# Patient Record
Sex: FEMALE | Race: ASIAN | NOT HISPANIC OR LATINO | Employment: OTHER | URBAN - METROPOLITAN AREA
[De-identification: names, ages, dates, MRNs, and addresses within clinical notes are randomized per-mention and may not be internally consistent; named-entity substitution may affect disease eponyms.]

---

## 2017-05-22 NOTE — PROGRESS NOTES
SUBJECTIVE:     CC: Maria Del Carmen Almaraz is an 61 year old woman who presents for preventive health visit.     General -- Patient states she is feeling generally well. She presents with records of labs from the last 3 years. She notices she is not as agile as she used to be, which she relates to age. She states she has a gynecologist where she gets mammograms and pap tests. Patient received her Hep C screening from her gynecologist. She also sees a dermatologist for skin care. We discussed creating a living will and she plans to make one at a later date. Patient states she has plantar fascitis, which has flared up recently.    Arthritis -- Patient states she has noticed mild achiness in her knees in her hips.    Physical   Annual:     Getting at least 3 servings of Calcium per day::  Yes    Bi-annual eye exam::  Yes    Dental care twice a year::  Yes    Sleep apnea or symptoms of sleep apnea::  None    Diet::  Regular (no restrictions)    Frequency of exercise::  4-5 days/week    Duration of exercise::  45-60 minutes    Taking medications regularly::  Not Applicable    Additional concerns today::  No    Today's PHQ-2 Score:   PHQ-2 ( 1999 [employer]) 5/20/2017   Q1: Little interest or pleasure in doing things -   Q2: Feeling down, depressed or hopeless -   PHQ-2 Score -   Little interest or pleasure in doing things Not at all   Feeling down, depressed or hopeless Not at all   PHQ-2 Score 0     Abuse: Current or Past(Physical, Sexual or Emotional)- No  Do you feel safe in your environment - Yes    Social History   Substance Use Topics     Smoking status: Never Smoker     Smokeless tobacco: Never Used     Alcohol use Yes      Comment: rare     The patient does not drink >3 drinks per day nor >7 drinks per week.    No results for input(s): CHOL, HDL, LDL, TRIG, CHOLHDLRATIO, NHDL in the last 99324 hours.    Reviewed orders with patient.  Reviewed health maintenance and updated orders accordingly - Yes    Mammo Decision  "Support:  Mammo discussed, not appropriate for or declined by this patient.    Pertinent mammograms are reviewed under the imaging tab.  History of abnormal Pap smear: NO - age 30-65 PAP every 5 years with negative HPV co-testing recommended    Reviewed and updated as needed this visit by clinical staff  Tobacco  Allergies  Meds  Med Hx  Surg Hx  Fam Hx  Soc Hx        Reviewed and updated as needed this visit by Provider        History reviewed. No pertinent past medical history.     ROS:  Constitutional, HEENT, cardiovascular, pulmonary, GI, , musculoskeletal, neuro, skin, endocrine and psych systems are negative, except as in HPI or otherwise noted     This document serves as a record of the services and decisions personally performed and made by Milton Craft MD. It was created on their behalf by Mert Nascimento, a trained medical scribe. The creation of this document is based the provider's statements to the medical scribe.  Mert Nascimento May 23, 2017 9:03 AM    Problem list, Medication list, Allergies, and Medical/Social/Surgical histories reviewed in [facility] and updated as appropriate.  Labs reviewed in EPIC  OBJECTIVE:     /76 (BP Location: Right arm, Patient Position: Chair, Cuff Size: Adult Regular)  Pulse 74  Temp 97.2  F (36.2  C) (Oral)  Ht 1.626 m (5' 4\")  Wt 56.2 kg (124 lb)  SpO2 100%  BMI 21.28 kg/m2  EXAM:  GENERAL APPEARANCE: healthy, alert and no distress  EYES: Eyes grossly normal to inspection, PERRL and conjunctivae and sclerae normal  HENT: ear canals and TM's normal, nose and mouth without ulcers or lesions, oropharynx clear and oral mucous membranes moist  NECK: no adenopathy, no asymmetry, masses, or scars and thyroid normal to palpation  RESP: lungs clear to auscultation - no rales, rhonchi or wheezes  CV: regular rate and rhythm, normal S1 S2, no S3 or S4, no murmur, click or rub, no peripheral edema and peripheral pulses strong  ABDOMEN: soft, nontender, no " "hepatosplenomegaly, no masses and bowel sounds normal, normal liver edge  MS: no musculoskeletal defects are noted and gait is age appropriate without ataxia  SKIN: no suspicious lesions or rashes to visible skin  NEURO: mentation intact and speech normal  PSYCH: mentation appears normal and affect normal/bright    ASSESSMENT/PLAN:     (Z00.00) Routine history and physical examination of adult  (primary encounter diagnosis)  Comment: generally in excellent health , no real concerns at all  Plan: CBC with platelets differential, Comprehensive         metabolic panel, Vitamin B12            (Z13.6) CARDIOVASCULAR SCREENING; LDL GOAL LESS THAN 160  Comment: calculate framingham risk index score   Plan: Lipid panel reflex to direct LDL        And follow up as indicated     (D75.89) Macrocytosis without anemia  Comment: noted from previous laboratory workup with [facility]   Plan: CBC with platelets differential, Vitamin B12              COUNSELING:  Reviewed preventive health counseling, as reflected in patient instructions       Regular exercise       Healthy diet/nutrition       Vision screening       Hearing screening       Alcohol Use       Consider Hep C screening for patients born between 1945 and 1965     reports that she has never smoked. She has never used smokeless tobacco.    Estimated body mass index is 21.28 kg/(m^2) as calculated from the following:    Height as of this encounter: 1.626 m (5' 4\").    Weight as of this encounter: 56.2 kg (124 lb).     Counseling Resources:  ATP IV Guidelines  Pooled Cohorts Equation Calculator  Breast Cancer Risk Calculator  FRAX Risk Assessment  ICSI Preventive Guidelines  Dietary Guidelines for Americans, 2010  USDA's MyPlate  ASA Prophylaxis  Lung CA Screening    The information in this document, created by the medical scribe for me, accurately reflects the services I personally performed and the decisions made by me. I have reviewed and approved this document for " accuracy.   MD Milton Bhakta MD  [facility]

## 2017-05-22 NOTE — PATIENT INSTRUCTIONS
- Follow up with me in 1 year.    Preventive Health Recommendations  Female Ages 50 - 64    Yearly exam: See your health care provider every year in order to  o Review health changes.   o Discuss preventive care.    o Review your medicines if your doctor has prescribed any.      Get a Pap test every three years (unless you have an abnormal result and your provider advises testing more often).    If you get Pap tests with HPV test, you only need to test every 5 years, unless you have an abnormal result.     You do not need a Pap test if your uterus was removed (hysterectomy) and you have not had cancer.    You should be tested each year for STDs (sexually transmitted diseases) if you're at risk.     Have a mammogram every 1 to 2 years.    Have a colonoscopy at age 50, or have a yearly FIT test (stool test). These exams screen for colon cancer.      Have a cholesterol test every 5 years, or more often if advised.    Have a diabetes test (fasting glucose) every three years. If you are at risk for diabetes, you should have this test more often.     If you are at risk for osteoporosis (brittle bone disease), think about having a bone density scan (DEXA).    Shots: Get a flu shot each year. Get a tetanus shot every 10 years.    Nutrition:     Eat at least 5 servings of fruits and vegetables each day.    Eat whole-grain bread, whole-wheat pasta and brown rice instead of white grains and rice.    Talk to your provider about Calcium and Vitamin D.     Lifestyle    Exercise at least 150 minutes a week (30 minutes a day, 5 days a week). This will help you control your weight and prevent disease.    Limit alcohol to one drink per day.    No smoking.     Wear sunscreen to prevent skin cancer.     See your dentist every six months for an exam and cleaning.    See your eye doctor every 1 to 2 years.  [facility]    If you have any questions regarding to your visit please contact your care team:     Team [place]:   Clinic  Hours Telephone Number   Internal Medicine:  Dr. Janeth Bond, NP       7am-7pm  Monday - Thursday   7am-5pm  Fridays  [phone]  (Appointment scheduling available 24/7)    Questions about your visit?  Team Line  [phone]   Urgent Care - [place] - 11am-9pm Monday-Friday Saturday-Sunday- 9am-5pm   [place] - 5pm-9pm Monday-Friday Saturday-Sunday- 9am-5pm  [phone] - [place]   [phone] - [place]       What options do I have for visits at the clinic other than the traditional office visit?  To expand how we care for you, many of our providers are utilizing electronic visits (e-visits) and telephone visits, when medically appropriate, for interactions with their patients rather than a visit in the clinic.   We also offer nurse visits for many medical concerns. Just like any other service, we will bill your insurance company for this type of visit based on time spent on the phone with your provider. Not all insurance companies cover these visits. Please check with your medical insurance if this type of visit is covered. You will be responsible for any charges that are not paid by your insurance.      E-visits via [employer]:  generally incur a $35.00 fee.  Telephone visits:  Time spent on the phone: *charged based on time that is spent on the phone in increments of 10 minutes. Estimated cost:   5-10 mins $30.00   11-20 mins. $59.00   21-30 mins. $85.00   Use [employer] (secure email communication and access to your chart) to send your primary care provider a message or make an appointment. Ask someone on your Team how to sign up for [employer].    For a Price Quote for your services, please call our Consumer Price Line at [phone].    As always, Thank you for trusting us with your health care needs!    Laura Flores MA

## 2017-05-23 ENCOUNTER — OFFICE VISIT (OUTPATIENT)
Dept: FAMILY MEDICINE | Facility: CLINIC | Age: 62
End: 2017-05-23
Payer: COMMERCIAL

## 2017-05-23 VITALS
SYSTOLIC BLOOD PRESSURE: 102 MMHG | BODY MASS INDEX: 21.17 KG/M2 | WEIGHT: 124 LBS | HEART RATE: 74 BPM | TEMPERATURE: 97.2 F | OXYGEN SATURATION: 100 % | DIASTOLIC BLOOD PRESSURE: 76 MMHG | HEIGHT: 64 IN

## 2017-05-23 DIAGNOSIS — Z00.00 ROUTINE HISTORY AND PHYSICAL EXAMINATION OF ADULT: Primary | ICD-10-CM

## 2017-05-23 DIAGNOSIS — Z13.6 CARDIOVASCULAR SCREENING; LDL GOAL LESS THAN 160: ICD-10-CM

## 2017-05-23 DIAGNOSIS — D75.89 MACROCYTOSIS WITHOUT ANEMIA: ICD-10-CM

## 2017-05-23 LAB
ALBUMIN SERPL-MCNC: 4.1 G/DL (ref 3.4–5)
ALP SERPL-CCNC: 94 U/L (ref 40–150)
ALT SERPL W P-5'-P-CCNC: 25 U/L (ref 0–50)
ANION GAP SERPL CALCULATED.3IONS-SCNC: 9 MMOL/L (ref 3–14)
AST SERPL W P-5'-P-CCNC: 23 U/L (ref 0–45)
BASOPHILS # BLD AUTO: 0.1 10E9/L (ref 0–0.2)
BASOPHILS NFR BLD AUTO: 1.2 %
BILIRUB SERPL-MCNC: 0.6 MG/DL (ref 0.2–1.3)
BUN SERPL-MCNC: 14 MG/DL (ref 7–30)
CALCIUM SERPL-MCNC: 9.3 MG/DL (ref 8.5–10.1)
CHLORIDE SERPL-SCNC: 102 MMOL/L (ref 94–109)
CHOLEST SERPL-MCNC: 246 MG/DL
CO2 SERPL-SCNC: 29 MMOL/L (ref 20–32)
CREAT SERPL-MCNC: 0.86 MG/DL (ref 0.52–1.04)
DIFFERENTIAL METHOD BLD: NORMAL
EOSINOPHIL # BLD AUTO: 0.1 10E9/L (ref 0–0.7)
EOSINOPHIL NFR BLD AUTO: 2.5 %
ERYTHROCYTE [DISTWIDTH] IN BLOOD BY AUTOMATED COUNT: 12.7 % (ref 10–15)
GFR SERPL CREATININE-BSD FRML MDRD: 66 ML/MIN/1.7M2
GLUCOSE SERPL-MCNC: 89 MG/DL (ref 70–99)
HCT VFR BLD AUTO: 43.2 % (ref 35–47)
HDLC SERPL-MCNC: 74 MG/DL
HGB BLD-MCNC: 14.4 G/DL (ref 11.7–15.7)
LDLC SERPL CALC-MCNC: 150 MG/DL
LYMPHOCYTES # BLD AUTO: 1.7 10E9/L (ref 0.8–5.3)
LYMPHOCYTES NFR BLD AUTO: 38 %
MCH RBC QN AUTO: 31.9 PG (ref 26.5–33)
MCHC RBC AUTO-ENTMCNC: 33.3 G/DL (ref 31.5–36.5)
MCV RBC AUTO: 96 FL (ref 78–100)
MONOCYTES # BLD AUTO: 0.5 10E9/L (ref 0–1.3)
MONOCYTES NFR BLD AUTO: 11.3 %
NEUTROPHILS # BLD AUTO: 2 10E9/L (ref 1.6–8.3)
NEUTROPHILS NFR BLD AUTO: 47 %
NONHDLC SERPL-MCNC: 172 MG/DL
PLATELET # BLD AUTO: 233 10E9/L (ref 150–450)
POTASSIUM SERPL-SCNC: 3.8 MMOL/L (ref 3.4–5.3)
PROT SERPL-MCNC: 7.5 G/DL (ref 6.8–8.8)
RBC # BLD AUTO: 4.52 10E12/L (ref 3.8–5.2)
SODIUM SERPL-SCNC: 140 MMOL/L (ref 133–144)
TRIGL SERPL-MCNC: 111 MG/DL
VIT B12 SERPL-MCNC: 650 PG/ML (ref 193–986)
WBC # BLD AUTO: 4.3 10E9/L (ref 4–11)

## 2017-05-23 PROCEDURE — 36415 COLL VENOUS BLD VENIPUNCTURE: CPT | Performed by: INTERNAL MEDICINE

## 2017-05-23 PROCEDURE — 80053 COMPREHEN METABOLIC PANEL: CPT | Performed by: INTERNAL MEDICINE

## 2017-05-23 PROCEDURE — 85025 COMPLETE CBC W/AUTO DIFF WBC: CPT | Performed by: INTERNAL MEDICINE

## 2017-05-23 PROCEDURE — 99396 PREV VISIT EST AGE 40-64: CPT | Performed by: INTERNAL MEDICINE

## 2017-05-23 PROCEDURE — 82607 VITAMIN B-12: CPT | Performed by: INTERNAL MEDICINE

## 2017-05-23 PROCEDURE — 80061 LIPID PANEL: CPT | Performed by: INTERNAL MEDICINE

## 2017-05-23 RX ORDER — CHLORAL HYDRATE 500 MG
CAPSULE ORAL
COMMUNITY
Start: 2013-08-09 | End: 2022-09-22

## 2017-05-23 RX ORDER — DIPHENOXYLATE HYDROCHLORIDE AND ATROPINE SULFATE 2.5; .025 MG/1; MG/1
1 TABLET ORAL
COMMUNITY
Start: 2013-08-09

## 2017-05-23 NOTE — NURSING NOTE
"Chief Complaint   Patient presents with     Physical       Initial /76 (BP Location: Right arm, Patient Position: Chair, Cuff Size: Adult Regular)  Pulse 74  Temp 97.2  F (36.2  C) (Oral)  Ht 5' 4\" (1.626 m)  Wt 124 lb (56.2 kg)  SpO2 100%  BMI 21.28 kg/m2 Estimated body mass index is 21.28 kg/(m^2) as calculated from the following:    Height as of this encounter: 5' 4\" (1.626 m).    Weight as of this encounter: 124 lb (56.2 kg).  Medication Reconciliation: complete   Audrey CROSS CMA ([facility])      "

## 2017-05-23 NOTE — MR AVS SNAPSHOT
After Visit Summary   5/23/2017    Maria Del Carmen Almaraz    MRN: [id number]           Patient Information     Date Of Birth          1955        Visit Information        Provider Department      5/23/2017 8:50 AM Milton Craft MD [facility]        Today's Diagnoses     Routine history and physical examination of adult    -  1    CARDIOVASCULAR SCREENING; LDL GOAL LESS THAN 160        Macrocytosis without anemia          Care Instructions    - Follow up with me in 1 year.    Preventive Health Recommendations  Female Ages 50 - 64    Yearly exam: See your health care provider every year in order to  o Review health changes.   o Discuss preventive care.    o Review your medicines if your doctor has prescribed any.      Get a Pap test every three years (unless you have an abnormal result and your provider advises testing more often).    If you get Pap tests with HPV test, you only need to test every 5 years, unless you have an abnormal result.     You do not need a Pap test if your uterus was removed (hysterectomy) and you have not had cancer.    You should be tested each year for STDs (sexually transmitted diseases) if you're at risk.     Have a mammogram every 1 to 2 years.    Have a colonoscopy at age 50, or have a yearly FIT test (stool test). These exams screen for colon cancer.      Have a cholesterol test every 5 years, or more often if advised.    Have a diabetes test (fasting glucose) every three years. If you are at risk for diabetes, you should have this test more often.     If you are at risk for osteoporosis (brittle bone disease), think about having a bone density scan (DEXA).    Shots: Get a flu shot each year. Get a tetanus shot every 10 years.    Nutrition:     Eat at least 5 servings of fruits and vegetables each day.    Eat whole-grain bread, whole-wheat pasta and brown rice instead of white grains and rice.    Talk to your provider about Calcium and Vitamin D.      Lifestyle    Exercise at least 150 minutes a week (30 minutes a day, 5 days a week). This will help you control your weight and prevent disease.    Limit alcohol to one drink per day.    No smoking.     Wear sunscreen to prevent skin cancer.     See your dentist every six months for an exam and cleaning.    See your eye doctor every 1 to 2 years.  [facility]    If you have any questions regarding to your visit please contact your care team:     Team [place]:   Clinic Hours Telephone Number   Internal Medicine:  Dr. Janeth Bond, NP       7am-7pm  Monday - Thursday   7am-5pm  Fridays  [phone]  (Appointment scheduling available 24/7)    Questions about your visit?  Team Line  [phone]   Urgent Care - [place] [facility] - 11am-9pm Monday-Friday Saturday-Sunday- 9am-5pm   [place] - 5pm-9pm Monday-Friday Saturday-Sunday- 9am-5pm  [phone] - [place]   [phone] - [place]       What options do I have for visits at the clinic other than the traditional office visit?  To expand how we care for you, many of our providers are utilizing electronic visits (e-visits) and telephone visits, when medically appropriate, for interactions with their patients rather than a visit in the clinic.   We also offer nurse visits for many medical concerns. Just like any other service, we will bill your insurance company for this type of visit based on time spent on the phone with your provider. Not all insurance companies cover these visits. Please check with your medical insurance if this type of visit is covered. You will be responsible for any charges that are not paid by your insurance.      E-visits via [employer]:  generally incur a $35.00 fee.  Telephone visits:  Time spent on the phone: *charged based on time that is spent on the phone in increments of 10 minutes. Estimated cost:   5-10 mins $30.00   11-20 mins. $59.00   21-30 mins. $85.00   Use  "MyChart (secure email communication and access to your chart) to send your primary care provider a message or make an appointment. Ask someone on your Team how to sign up for [employer].    For a Price Quote for your services, please call our [employer] Price Line at [phone].    As always, Thank you for trusting us with your health care needs!    Laura Flores MA          Follow-ups after your visit        Who to contact     If you have questions or need follow up information about today's clinic visit or your schedule please contact [facility] directly at [phone].  Normal or non-critical lab and imaging results will be communicated to you by MyChart, letter or phone within 4 business days after the clinic has received the results. If you do not hear from us within 7 days, please contact the clinic through [employer] or phone. If you have a critical or abnormal lab result, we will notify you by phone as soon as possible.  Submit refill requests through [employer] or call your pharmacy and they will forward the refill request to us. Please allow 3 business days for your refill to be completed.          Additional Information About Your Visit        MyChart Information     [employer] gives you secure access to your electronic health record. If you see a primary care provider, you can also send messages to your care team and make appointments. If you have questions, please call your primary care clinic.  If you do not have a primary care provider, please call [phone] and they will assist you.        Care EveryWhere ID     This is your Care EveryWhere ID. This could be used by other organizations to access your [facility] medical records  [id number]        Your Vitals Were     Pulse Temperature Height Pulse Oximetry BMI (Body Mass Index)       74 97.2  F (36.2  C) (Oral) 5' 4\" (1.626 m) 100% 21.28 kg/m2        Blood Pressure from Last 3 Encounters:   05/23/17 102/76   06/30/16 122/76   06/28/16 122/68 "    Weight from Last 3 Encounters:   05/23/17 124 lb (56.2 kg)   06/30/16 125 lb (56.7 kg)   06/28/16 125 lb (56.7 kg)              We Performed the Following     CBC with platelets differential     Comprehensive metabolic panel     Lipid panel reflex to direct LDL     Vitamin B12        Primary Care Provider Office Phone # Fax #    AUNG [facility] [phone]       [address] [address]        Thank you!     Thank you for choosing [facility]  for your care. Our goal is always to provide you with excellent care. Hearing back from our patients is one way we can continue to improve our services. Please take a few minutes to complete the written survey that you may receive in the mail after your visit with us. Thank you!             Your Updated Medication List - Protect others around you: Learn how to safely use, store and throw away your medicines at www.disposemymeds.org.          This list is accurate as of: 5/23/17  9:27 AM.  Always use your most recent med list.                   Brand Name Dispense Instructions for use    ADVIL PO      Take by mouth as needed for moderate pain       Calcium-Vitamin D-Vitamin K 500-100-40 MG-UNT-MCG Chew      Take 1 tablet by mouth       fish oil-omega-3 fatty acids 1000 MG capsule          MULTI-VITAMINS Tabs      Take 1 tablet by mouth

## 2017-05-23 NOTE — LETTER
"                                                     [facility]  [address] [place] [address]. [place]  [place] [address]    May 23, 2017    Maria Del Carmen [address] [place] [address]          Dear Maria Del Carmen,    Enclosed is a copy of your results. All of these tests are within acceptable limits. There's a slight elevation with the low density lipoprotein, \"the bad cholesterol\", but your framingham risk index score is 2.8%, and so there's no indication for treatment with statin medication. Things look good ! !     Take care Maria Del Carmen.    Results for orders placed or performed in visit on 05/23/17   Lipid panel reflex to direct LDL   Result Value Ref Range    Cholesterol 246 (H) <200 mg/dL    Triglycerides 111 <150 mg/dL    HDL Cholesterol 74 >49 mg/dL    LDL Cholesterol Calculated 150 (H) <100 mg/dL    Non HDL Cholesterol 172 (H) <130 mg/dL   CBC with platelets differential   Result Value Ref Range    WBC 4.3 4.0 - 11.0 10e9/L    RBC Count 4.52 3.8 - 5.2 10e12/L    Hemoglobin 14.4 11.7 - 15.7 g/dL    Hematocrit 43.2 35.0 - 47.0 %    MCV 96 78 - 100 fl    MCH 31.9 26.5 - 33.0 pg    MCHC 33.3 31.5 - 36.5 g/dL    RDW 12.7 10.0 - 15.0 %    Platelet Count 233 150 - 450 10e9/L    Diff Method Automated Method     % Neutrophils 47.0 %    % Lymphocytes 38.0 %    % Monocytes 11.3 %    % Eosinophils 2.5 %    % Basophils 1.2 %    Absolute Neutrophil 2.0 1.6 - 8.3 10e9/L    Absolute Lymphocytes 1.7 0.8 - 5.3 10e9/L    Absolute Monocytes 0.5 0.0 - 1.3 10e9/L    Absolute Eosinophils 0.1 0.0 - 0.7 10e9/L    Absolute Basophils 0.1 0.0 - 0.2 10e9/L   Comprehensive metabolic panel   Result Value Ref Range    Sodium 140 133 - 144 mmol/L    Potassium 3.8 3.4 - 5.3 mmol/L    Chloride 102 94 - 109 mmol/L    Carbon Dioxide 29 20 - 32 mmol/L    Anion Gap 9 3 - 14 mmol/L    Glucose 89 70 - 99 mg/dL    Urea Nitrogen 14 7 - 30 mg/dL    Creatinine 0.86 0.52 - 1.04 mg/dL    GFR Estimate 66 >60 mL/min/1.7m2    GFR Estimate If Black 80 >60 mL/min/1.7m2 "    Calcium 9.3 8.5 - 10.1 mg/dL    Bilirubin Total 0.6 0.2 - 1.3 mg/dL    Albumin 4.1 3.4 - 5.0 g/dL    Protein Total 7.5 6.8 - 8.8 g/dL    Alkaline Phosphatase 94 40 - 150 U/L    ALT 25 0 - 50 U/L    AST 23 0 - 45 U/L   Vitamin B12   Result Value Ref Range    Vitamin B12 650 193 - 986 pg/mL     If you have any questions or concerns, please call myself or my nurse at [phone].    Sincerely,        Milton Craft MD/rk

## 2017-07-06 ENCOUNTER — TELEPHONE (OUTPATIENT)
Dept: FAMILY MEDICINE | Facility: CLINIC | Age: 62
End: 2017-07-06

## 2017-07-06 NOTE — LETTER
September 28, 2017          Maria Del Carmen Almaraz  [address] [place] [address]        Dear Maria Del Carmen Almaraz      Monitoring and managing your preventative and chronic health conditions are very important to us. Our records indicate that you have not scheduled a Mammogram  which was recommended by Christel Bond CNP.  We tried to reach you by phone, but were unable to do so.    If you have received your health care elsewhere, please call the clinic so the information can be documented in your chart.    Please call [phone] or message us through your [employer] account to schedule an appointment or provide information for your chart.     Feel free to contact us if you have any questions or concerns!    We look forward to seeing you and working with you on your health care needs.     Sincerely,       [facility]

## 2017-09-27 NOTE — TELEPHONE ENCOUNTER
Left message for Maria Del Carmen to call scheduling.  Needs mammo.  Please send letter or mychart message.  Lilly Joseph RT

## 2017-10-01 ENCOUNTER — HEALTH MAINTENANCE LETTER (OUTPATIENT)
Age: 62
End: 2017-10-01

## 2017-10-24 ENCOUNTER — TRANSFERRED RECORDS (OUTPATIENT)
Dept: HEALTH INFORMATION MANAGEMENT | Facility: CLINIC | Age: 62
End: 2017-10-24

## 2017-10-24 LAB
CHOLEST SERPL-MCNC: 240 MG/DL (ref 100–199)
GLUCOSE SERPL-MCNC: 87 MG/DL (ref 65–100)
HDLC SERPL-MCNC: 61 MG/DL
LDLC SERPL CALC-MCNC: 152 MG/DL
NONHDLC SERPL-MCNC: 179 MG/DL
PAP-ABSTRACT: NORMAL
TRIGL SERPL-MCNC: 137 MG/DL
TSH SERPL-ACNC: 0.59 UIU/ML (ref 0.35–4.94)

## 2017-11-14 ENCOUNTER — ALLIED HEALTH/NURSE VISIT (OUTPATIENT)
Dept: NURSING | Facility: CLINIC | Age: 62
End: 2017-11-14
Payer: COMMERCIAL

## 2017-11-14 DIAGNOSIS — Z23 NEED FOR PROPHYLACTIC VACCINATION AND INOCULATION AGAINST INFLUENZA: Primary | ICD-10-CM

## 2017-11-14 PROCEDURE — 90471 IMMUNIZATION ADMIN: CPT

## 2017-11-14 PROCEDURE — 90686 IIV4 VACC NO PRSV 0.5 ML IM: CPT

## 2017-11-14 PROCEDURE — 99207 ZZC NO CHARGE NURSE ONLY: CPT

## 2017-11-14 NOTE — MR AVS SNAPSHOT
After Visit Summary   11/14/2017    Maria Del Carmen Almaraz    MRN: [id number]           Patient Information     Date Of Birth          1955        Visit Information        Provider Department      11/14/2017 11:00 AM FZ ANCILLARY [facility]        Today's Diagnoses     Need for prophylactic vaccination and inoculation against influenza    -  1       Follow-ups after your visit        Who to contact     If you have questions or need follow up information about today's clinic visit or your schedule please contact [facility] directly at [phone].  Normal or non-critical lab and imaging results will be communicated to you by [employer], letter or phone within 4 business days after the clinic has received the results. If you do not hear from us within 7 days, please contact the clinic through [employer] or phone. If you have a critical or abnormal lab result, we will notify you by phone as soon as possible.  Submit refill requests through "[employer] or call your pharmacy and they will forward the refill request to us. Please allow 3 business days for your refill to be completed.          Additional Information About Your Visit        MyChart Information     "[employer] gives you secure access to your electronic health record. If you see a primary care provider, you can also send messages to your care team and make appointments. If you have questions, please call your primary care clinic.  If you do not have a primary care provider, please call [phone] and they will assist you.        Care EveryWhere ID     This is your Care EveryWhere ID. This could be used by other organizations to access your [place] medical records  [id number]         Blood Pressure from Last 3 Encounters:   05/23/17 102/76   06/30/16 122/76   06/28/16 122/68    Weight from Last 3 Encounters:   05/23/17 124 lb (56.2 kg)   06/30/16 125 lb (56.7 kg)   06/28/16 125 lb (56.7 kg)              We Performed the Following      FLU VAC, SPLIT VIRUS IM > 3 YO (QUADRIVALENT) [07059]     Vaccine Administration, Initial [02131]        Primary Care Provider Office Phone # Fax #    AUNG Pineda [facility] [phone] [address] [address]        Equal Access to Services     [facility] : Hadii aad ku hadasho Soomaali, waaxda luqadaha, qaybta kaalmada adeegyada, waxay idiin hayaan adeeg kharash la'aan . So [facility] [phone].    ATENCIÓN: Si habla español, tiene a jacobson disposición servicios gratuitos de asistencia lingüística. Lauro al [phone].    We comply with applicable federal civil rights laws and [place] laws. We do not discriminate on the basis of race, color, national origin, age, disability, sex, sexual orientation, or gender identity.            Thank you!     Thank you for choosing [facility]  for your care. Our goal is always to provide you with excellent care. Hearing back from our patients is one way we can continue to improve our services. Please take a few minutes to complete the written survey that you may receive in the mail after your visit with us. Thank you!             Your Updated Medication List - Protect others around you: Learn how to safely use, store and throw away your medicines at www.disposemymeds.org.          This list is accurate as of: 11/14/17 12:27 PM.  Always use your most recent med list.                   Brand Name Dispense Instructions for use Diagnosis    ADVIL PO      Take by mouth as needed for moderate pain        Calcium-Vitamin D-Vitamin K 500-100-40 MG-UNT-MCG Chew      Take 1 tablet by mouth        fish oil-omega-3 fatty acids 1000 MG capsule           MULTI-VITAMINS Tabs      Take 1 tablet by mouth

## 2017-11-14 NOTE — PROGRESS NOTES

## 2018-06-08 ENCOUNTER — OFFICE VISIT (OUTPATIENT)
Dept: FAMILY MEDICINE | Facility: CLINIC | Age: 63
End: 2018-06-08
Payer: COMMERCIAL

## 2018-06-08 VITALS
TEMPERATURE: 96.8 F | HEART RATE: 74 BPM | SYSTOLIC BLOOD PRESSURE: 126 MMHG | HEIGHT: 64 IN | RESPIRATION RATE: 14 BRPM | DIASTOLIC BLOOD PRESSURE: 84 MMHG | OXYGEN SATURATION: 100 % | WEIGHT: 122 LBS | BODY MASS INDEX: 20.83 KG/M2

## 2018-06-08 DIAGNOSIS — Z12.4 SCREENING FOR MALIGNANT NEOPLASM OF CERVIX: ICD-10-CM

## 2018-06-08 DIAGNOSIS — Z23 NEED FOR SHINGLES VACCINE: ICD-10-CM

## 2018-06-08 DIAGNOSIS — Z00.00 ROUTINE GENERAL MEDICAL EXAMINATION AT A HEALTH CARE FACILITY: Primary | ICD-10-CM

## 2018-06-08 DIAGNOSIS — Z11.4 SCREENING FOR HIV (HUMAN IMMUNODEFICIENCY VIRUS): ICD-10-CM

## 2018-06-08 DIAGNOSIS — E78.5 HYPERLIPIDEMIA LDL GOAL <160: ICD-10-CM

## 2018-06-08 LAB
ANION GAP SERPL CALCULATED.3IONS-SCNC: 6 MMOL/L (ref 3–14)
BUN SERPL-MCNC: 15 MG/DL (ref 7–30)
CALCIUM SERPL-MCNC: 9.3 MG/DL (ref 8.5–10.1)
CHLORIDE SERPL-SCNC: 105 MMOL/L (ref 94–109)
CHOLEST SERPL-MCNC: 217 MG/DL
CO2 SERPL-SCNC: 29 MMOL/L (ref 20–32)
CREAT SERPL-MCNC: 0.87 MG/DL (ref 0.52–1.04)
GFR SERPL CREATININE-BSD FRML MDRD: 66 ML/MIN/1.7M2
GLUCOSE SERPL-MCNC: 96 MG/DL (ref 70–99)
HDLC SERPL-MCNC: 73 MG/DL
HGB BLD-MCNC: 14.6 G/DL (ref 11.7–15.7)
LDLC SERPL CALC-MCNC: 125 MG/DL
NONHDLC SERPL-MCNC: 144 MG/DL
POTASSIUM SERPL-SCNC: 4.7 MMOL/L (ref 3.4–5.3)
SODIUM SERPL-SCNC: 140 MMOL/L (ref 133–144)
TRIGL SERPL-MCNC: 94 MG/DL

## 2018-06-08 PROCEDURE — 85018 HEMOGLOBIN: CPT | Performed by: INTERNAL MEDICINE

## 2018-06-08 PROCEDURE — 80061 LIPID PANEL: CPT | Performed by: INTERNAL MEDICINE

## 2018-06-08 PROCEDURE — 99396 PREV VISIT EST AGE 40-64: CPT | Performed by: INTERNAL MEDICINE

## 2018-06-08 PROCEDURE — 36415 COLL VENOUS BLD VENIPUNCTURE: CPT | Performed by: INTERNAL MEDICINE

## 2018-06-08 PROCEDURE — 80048 BASIC METABOLIC PNL TOTAL CA: CPT | Performed by: INTERNAL MEDICINE

## 2018-06-08 PROCEDURE — 87389 HIV-1 AG W/HIV-1&-2 AB AG IA: CPT | Performed by: INTERNAL MEDICINE

## 2018-06-08 NOTE — MR AVS SNAPSHOT
After Visit Summary   6/8/2018    Maria Del Carmen Almaraz    MRN: [id number]           Patient Information     Date Of Birth          1955        Visit Information        Provider Department      6/8/2018 9:10 AM Milton Craft MD [facility]        Today's Diagnoses     Routine general medical examination at a health care facility    -  1    Screening for malignant neoplasm of cervix        Screening for HIV (human immunodeficiency virus)        Hyperlipidemia LDL goal <160        Need for shingles vaccine          Care Instructions    [facility]    If you have any questions regarding to your visit please contact your care team:     Team Pink:   Clinic Hours Telephone Number   Internal Medicine:  Dr. Janeth Bond NP       7am-7pm  Monday - Thursday   7am-5pm  Fridays  [phone]  (Appointment scheduling available 24/7)    Questions about your recent visit?  Team Line  [phone]   Urgent Care - [place] and [facility] - 11am-9pm Monday-Friday Saturday-Sunday- 9am-5pm   [place] - 5pm-9pm Monday-Friday Saturday-Sunday- 9am-5pm  [phone] - [place]  [phone] - [place]       What options do I have for a visit other than an office visit? We offer electronic visits (e-visits) and telephone visits, when medically appropriate.  Please check with your medical insurance to see if these types of visits are covered, as you will be responsible for any charges that are not paid by your insurance.      You can use [employer] (secure electronic communication) to access to your chart, send your primary care provider a message, or make an appointment. Ask a team member how to get started.     For a price quote for your services, please call our Consumer Price Line at [phone] or our Imaging Cost estimation line at [phone] (for imaging tests).    Discharged by ANN Palafox MA          Follow-ups after your visit        Who  "to contact     If you have questions or need follow up information about today's clinic visit or your schedule please contact [facility] directly at [phone].  Normal or non-critical lab and imaging results will be communicated to you by MyChart, letter or phone within 4 business days after the clinic has received the results. If you do not hear from us within 7 days, please contact the clinic through MyChart or phone. If you have a critical or abnormal lab result, we will notify you by phone as soon as possible.  Submit refill requests through [employer] or call your pharmacy and they will forward the refill request to us. Please allow 3 business days for your refill to be completed.          Additional Information About Your Visit        [employer] Information     [employer] gives you secure access to your electronic health record. If you see a primary care provider, you can also send messages to your care team and make appointments. If you have questions, please call your primary care clinic.  If you do not have a primary care provider, please call [phone] and they will assist you.        Care EveryWhere ID     This is your Care EveryWhere ID. This could be used by other organizations to access your [place] medical records  [id number]        Your Vitals Were     Pulse Temperature Respirations Height Pulse Oximetry BMI (Body Mass Index)    74 96.8  F (36  C) (Oral) 14 5' 3.78\" (1.62 m) 100% 21.09 kg/m2       Blood Pressure from Last 3 Encounters:   06/08/18 126/84   05/23/17 102/76   06/30/16 122/76    Weight from Last 3 Encounters:   06/08/18 122 lb (55.3 kg)   05/23/17 124 lb (56.2 kg)   06/30/16 125 lb (56.7 kg)              We Performed the Following     Basic metabolic panel     Hemoglobin     HIV Screening     Lipid panel reflex to direct LDL Fasting        Primary Care Provider Office Phone # Fax #    AUNG Pineda [phone]       [address] [facility] [address] [place] [address]        Equal Access to Services     [facility] SUSANNAH : Hadaddis timbo ridley ramseslorena Parmjitali, wavitalyda lutitigarcíaha, qadorethata alexisdonyatom delgado. So [facility] [phone].    ATENCIÓN: Si habla español, tiene a jacobson disposición servicios gratuitos de asistencia lingüística. [facility] [phone].    We comply with applicable federal civil rights laws and [place] laws. We do not discriminate on the basis of race, color, national origin, age, disability, sex, sexual orientation, or gender identity.            Thank you!     Thank you for choosing [facility]  for your care. Our goal is always to provide you with excellent care. Hearing back from our patients is one way we can continue to improve our services. Please take a few minutes to complete the written survey that you may receive in the mail after your visit with us. Thank you!             Your Updated Medication List - Protect others around you: Learn how to safely use, store and throw away your medicines at www.disposemymeds.org.          This list is accurate as of 6/8/18  9:42 AM.  Always use your most recent med list.                   Brand Name Dispense Instructions for use Diagnosis    ADVIL PO      Take by mouth as needed for moderate pain        Calcium-Vitamin D-Vitamin K 500-100-40 MG-UNT-MCG Chew      Take 1 tablet by mouth        fish oil-omega-3 fatty acids 1000 MG capsule           MULTI-VITAMINS Tabs      Take 1 tablet by mouth

## 2018-06-08 NOTE — PROGRESS NOTES
SUBJECTIVE:   CC: Maria Del Carmen Almaraz is an 62 year old woman who presents for preventive health visit.     Physical   Annual:     Getting at least 3 servings of Calcium per day::  Yes    Bi-annual eye exam::  Yes    Dental care twice a year::  Yes    Sleep apnea or symptoms of sleep apnea::  None    Diet::  Regular (no restrictions)    Frequency of exercise::  4-5 days/week    Duration of exercise::  Greater than 60 minutes    Taking medications regularly::  Yes    Medication side effects::  Not applicable    Additional concerns today::  No          She is feeling well today, and would like to switch to me for her primary care, and she doesn't really have a primary care doctor because she is fundamentally well and sees MD's infrequently . She would like to have her cholesterol rechecked.       Today's PHQ-2 Score:   PHQ-2 ( 1999 Pfizer) 6/6/2018   Q1: Little interest or pleasure in doing things 0   Q2: Feeling down, depressed or hopeless 0   PHQ-2 Score 0   Q1: Little interest or pleasure in doing things Not at all   Q2: Feeling down, depressed or hopeless Not at all   PHQ-2 Score 0     Abuse: Current or Past(Physical, Sexual or Emotional)- No  Do you feel safe in your environment - Yes    Social History   Substance Use Topics     Smoking status: Never Smoker     Smokeless tobacco: Never Used     Alcohol use Yes      Comment: rarely, 1 or 2 glasses a year     Alcohol Use 6/6/2018   If you drink alcohol do you typically have greater than 3 drinks per day OR greater than 7 drinks per week? No   No flowsheet data found.    Reviewed orders with patient.  Reviewed health maintenance and updated orders accordingly - Yes    Labs reviewed in EPIC  BP Readings from Last 3 Encounters:   06/08/18 126/84   05/23/17 102/76   06/30/16 122/76    Wt Readings from Last 3 Encounters:   06/08/18 55.3 kg (122 lb)   05/23/17 56.2 kg (124 lb)   06/30/16 56.7 kg (125 lb)         Patient over age 50, mutual decision to screen reflected in health  "maintenance.    Pertinent mammograms are reviewed under the imaging tab.  History of abnormal Pap smear: NO - age 63 - see link Cervical Cytology Screening Guidelines  [facility] Records: Last PAP 10/24/17- NIL  8/12/14- NIL  7/3/12- NIL  4/4/11- NIL  Okay to stop PAP smears.    Reviewed and updated as needed this visit by clinical staff  Tobacco  Allergies  Meds       Reviewed and updated as needed this visit by Provider        No past medical history on file.   Past Surgical History:   Procedure Laterality Date     COLONOSCOPY  10/2009     NO HISTORY OF SURGERY         Review of Systems  CONSTITUTIONAL: NEGATIVE for fever, chills, change in weight  INTEGUMENTARY/SKIN: NEGATIVE for worrisome rashes, moles or lesions  EYES: NEGATIVE for vision changes or irritation  ENT: NEGATIVE for ear, mouth and throat problems  RESP: NEGATIVE for significant cough or SOB  BREAST: NEGATIVE for masses, tenderness or discharge  CV: NEGATIVE for chest pain, palpitations or peripheral edema  GI: NEGATIVE for nausea, abdominal pain, heartburn, or change in bowel habits  : NEGATIVE for unusual urinary or vaginal symptoms. No vaginal bleeding.  MUSCULOSKELETAL: NEGATIVE for significant arthralgias or myalgia  NEURO: NEGATIVE for weakness, dizziness or paresthesias  PSYCHIATRIC: NEGATIVE for changes in mood or affect     This document serves as a record of the services and decisions personally performed and made by Milton Craft MD. It was created on his behalf by Sarina Butt, a trained medical scribe. The creation of this document is based on the provider's statements to the medical scribe.  Sarina Butt June 8, 2018 9:29 AM     OBJECTIVE:   /84  Pulse 74  Temp 96.8  F (36  C) (Oral)  Resp 14  Ht 1.62 m (5' 3.78\")  Wt 55.3 kg (122 lb)  SpO2 100%  BMI 21.09 kg/m2  Physical Exam  GENERAL: healthy, alert and no distress  EYES: Eyes grossly normal to inspection, PERRL and conjunctivae and sclerae normal  HENT: ear canals and TM's " normal, nose and mouth without ulcers or lesions  NECK: no adenopathy, no asymmetry, masses, or scars and thyroid normal to palpation  RESP: lungs clear to auscultation - no rales, rhonchi or wheezes  CV: regular rate and rhythm, normal S1 S2, no S3 or S4, no murmur, click or rub, no peripheral edema and peripheral pulses strong  ABDOMEN: soft, nontender, no hepatosplenomegaly, no masses and bowel sounds normal  MS: no gross musculoskeletal defects noted, no edema  SKIN: no suspicious lesions or rashes to visible skin  NEURO: Normal strength and tone, mentation intact and speech normal  PSYCH: mentation appears normal, affect normal/bright    ASSESSMENT/PLAN:   (Z00.00) Routine general medical examination at a health care facility  (primary encounter diagnosis)  Comment: routine screening issues   Plan: Basic metabolic panel, Hemoglobin            (Z12.4) Screening for malignant neoplasm of cervix  Comment: Okay to stop PAP smears, she has had 3 negative results for her past screenings.   Plan: most recent previous pap and pelvic examination results sent to abstracting    (Z11.4) Screening for HIV (human immunodeficiency virus)  Comment: routine screening   Plan: HIV Screening            (E78.5) Hyperlipidemia LDL goal <160  Comment: routine screening   Plan: Lipid panel reflex to direct LDL Fasting            (Z23) Need for shingles vaccine  Comment: Recommended receiving the new Shingrix vaccine  Plan: recommended     COUNSELING:  Reviewed preventive health counseling, as reflected in patient instructions    BP Screening:   Last 3 BP Readings:    BP Readings from Last 3 Encounters:   06/08/18 126/84   05/23/17 102/76   06/30/16 122/76     The following was recommended to the patient:  Re-screen BP within a year and recommended lifestyle modifications     reports that she has never smoked. She has never used smokeless tobacco.    Estimated body mass index is 21.09 kg/(m^2) as calculated from the following:     "Height as of this encounter: 1.62 m (5' 3.78\").    Weight as of this encounter: 55.3 kg (122 lb).     Counseling Resources:  ATP IV Guidelines  Pooled Cohorts Equation Calculator  Breast Cancer Risk Calculator  FRAX Risk Assessment  ICSI Preventive Guidelines  Dietary Guidelines for Americans, 2010  USDA's MyPlate  ASA Prophylaxis  Lung CA Screening    The information in this document, created by the medical scribe for me, accurately reflects the services I personally performed and the decisions made by me. I have reviewed and approved this document for accuracy.   MD Milton Bhakta MD  [facility]    "

## 2018-06-08 NOTE — LETTER
[facility] [address] [place] [address]    June 12, 2018    Maria Del Carmen Almaraz  [address] [place] [address]          Dear Maria Del Carmen,    All of these tests are within acceptable limits. Things look OK !       Results for orders placed or performed in visit on 06/08/18   HIV Screening   Result Value Ref Range    HIV Antigen Antibody Combo Nonreactive NR^Nonreactive       Lipid panel reflex to direct LDL Fasting   Result Value Ref Range    Cholesterol 217 (H) <200 mg/dL    Triglycerides 94 <150 mg/dL    HDL Cholesterol 73 >49 mg/dL    LDL Cholesterol Calculated 125 (H) <100 mg/dL    Non HDL Cholesterol 144 (H) <130 mg/dL   Basic metabolic panel   Result Value Ref Range    Sodium 140 133 - 144 mmol/L    Potassium 4.7 3.4 - 5.3 mmol/L    Chloride 105 94 - 109 mmol/L    Carbon Dioxide 29 20 - 32 mmol/L    Anion Gap 6 3 - 14 mmol/L    Glucose 96 70 - 99 mg/dL    Urea Nitrogen 15 7 - 30 mg/dL    Creatinine 0.87 0.52 - 1.04 mg/dL    GFR Estimate 66 >60 mL/min/1.7m2    GFR Estimate If Black 80 >60 mL/min/1.7m2    Calcium 9.3 8.5 - 10.1 mg/dL   Hemoglobin   Result Value Ref Range    Hemoglobin 14.6 11.7 - 15.7 g/dL       If you have any questions or concerns, please me or my clinic team at [phone].      Sincerely,        Milton Craft MD/bt

## 2018-06-08 NOTE — PATIENT INSTRUCTIONS
[facility]    If you have any questions regarding to your visit please contact your care team:     Team Pink:   Clinic Hours Telephone Number   Internal Medicine:  Dr. Janeth Bond NP       7am-7pm  Monday - Thursday   7am-5pm  Fridays  [phone]  (Appointment scheduling available 24/7)    Questions about your recent visit?  Team Line  [phone]   Urgent Care - [place] - 11am-9pm Monday-Friday Saturday-Sunday- 9am-5pm   [place] - 5pm-9pm Monday-Friday Saturday-Sunday- 9am-5pm  [phone] - [place]  [phone] - [place]       What options do I have for a visit other than an office visit? We offer electronic visits (e-visits) and telephone visits, when medically appropriate.  Please check with your medical insurance to see if these types of visits are covered, as you will be responsible for any charges that are not paid by your insurance.      You can use [employer] (secure electronic communication) to access to your chart, send your primary care provider a message, or make an appointment. Ask a team member how to get started.     For a price quote for your services, please call our Consumer Price Line at [phone] or our Imaging Cost estimation line at [phone] (for imaging tests).    Discharged by ANN Palafox MA

## 2018-06-11 LAB — HIV 1+2 AB+HIV1 P24 AG SERPL QL IA: NONREACTIVE

## 2018-10-31 ENCOUNTER — TRANSFERRED RECORDS (OUTPATIENT)
Dept: MULTI SPECIALTY CLINIC | Facility: CLINIC | Age: 63
End: 2018-10-31

## 2019-08-06 ENCOUNTER — OFFICE VISIT (OUTPATIENT)
Dept: FAMILY MEDICINE | Facility: CLINIC | Age: 64
End: 2019-08-06
Payer: COMMERCIAL

## 2019-08-06 VITALS
BODY MASS INDEX: 21.03 KG/M2 | OXYGEN SATURATION: 100 % | WEIGHT: 123.2 LBS | RESPIRATION RATE: 18 BRPM | DIASTOLIC BLOOD PRESSURE: 68 MMHG | HEIGHT: 64 IN | HEART RATE: 71 BPM | SYSTOLIC BLOOD PRESSURE: 116 MMHG | TEMPERATURE: 97.1 F

## 2019-08-06 DIAGNOSIS — Z23 NEED FOR VACCINATION WITH 13-POLYVALENT PNEUMOCOCCAL CONJUGATE VACCINE: ICD-10-CM

## 2019-08-06 DIAGNOSIS — Z00.00 MEDICARE ANNUAL WELLNESS VISIT, INITIAL: ICD-10-CM

## 2019-08-06 DIAGNOSIS — Z12.11 SPECIAL SCREENING FOR MALIGNANT NEOPLASMS, COLON: ICD-10-CM

## 2019-08-06 DIAGNOSIS — E78.5 HYPERLIPIDEMIA LDL GOAL <160: Primary | ICD-10-CM

## 2019-08-06 DIAGNOSIS — Z23 NEED FOR DIPHTHERIA-TETANUS-PERTUSSIS (TDAP) VACCINE: ICD-10-CM

## 2019-08-06 LAB
ANION GAP SERPL CALCULATED.3IONS-SCNC: 5 MMOL/L (ref 3–14)
BUN SERPL-MCNC: 14 MG/DL (ref 7–30)
CALCIUM SERPL-MCNC: 9.6 MG/DL (ref 8.5–10.1)
CHLORIDE SERPL-SCNC: 106 MMOL/L (ref 94–109)
CHOLEST SERPL-MCNC: 206 MG/DL
CO2 SERPL-SCNC: 30 MMOL/L (ref 20–32)
CREAT SERPL-MCNC: 0.77 MG/DL (ref 0.52–1.04)
GFR SERPL CREATININE-BSD FRML MDRD: 80 ML/MIN/{1.73_M2}
GLUCOSE SERPL-MCNC: 95 MG/DL (ref 70–99)
HDLC SERPL-MCNC: 66 MG/DL
HGB BLD-MCNC: 14.1 G/DL (ref 11.7–15.7)
LDLC SERPL CALC-MCNC: 113 MG/DL
NONHDLC SERPL-MCNC: 140 MG/DL
POTASSIUM SERPL-SCNC: 4.9 MMOL/L (ref 3.4–5.3)
SODIUM SERPL-SCNC: 141 MMOL/L (ref 133–144)
TRIGL SERPL-MCNC: 134 MG/DL

## 2019-08-06 PROCEDURE — 80061 LIPID PANEL: CPT | Performed by: INTERNAL MEDICINE

## 2019-08-06 PROCEDURE — 90715 TDAP VACCINE 7 YRS/> IM: CPT | Performed by: INTERNAL MEDICINE

## 2019-08-06 PROCEDURE — G0402 INITIAL PREVENTIVE EXAM: HCPCS | Performed by: INTERNAL MEDICINE

## 2019-08-06 PROCEDURE — 36415 COLL VENOUS BLD VENIPUNCTURE: CPT | Performed by: INTERNAL MEDICINE

## 2019-08-06 PROCEDURE — 85018 HEMOGLOBIN: CPT | Performed by: INTERNAL MEDICINE

## 2019-08-06 PROCEDURE — G0009 ADMIN PNEUMOCOCCAL VACCINE: HCPCS | Mod: 59 | Performed by: INTERNAL MEDICINE

## 2019-08-06 PROCEDURE — 80048 BASIC METABOLIC PNL TOTAL CA: CPT | Performed by: INTERNAL MEDICINE

## 2019-08-06 PROCEDURE — 90471 IMMUNIZATION ADMIN: CPT | Performed by: INTERNAL MEDICINE

## 2019-08-06 PROCEDURE — 90670 PCV13 VACCINE IM: CPT | Performed by: INTERNAL MEDICINE

## 2019-08-06 ASSESSMENT — ACTIVITIES OF DAILY LIVING (ADL): CURRENT_FUNCTION: NO ASSISTANCE NEEDED

## 2019-08-06 ASSESSMENT — MIFFLIN-ST. JEOR: SCORE: 1080.89

## 2019-08-06 NOTE — PATIENT INSTRUCTIONS
1) We are planning to administer the tetanus booster today as well as the Pneumonia-13 vaccine. We would consider the Pneumonia-23 in one years time.     2) Follow-up with [place] [facility] and schedule an appointment in a few months for your colonoscopy.     3) We will use your cholesterol numbers to calculate the framingham risk score. Depending on this we might talk about statin medications.

## 2019-08-06 NOTE — PROGRESS NOTES
"SUBJECTIVE:   Maria Del Carmen Almaraz is a 63 year old female who presents for Preventive Visit.  Last appointment with me was 6-8-2018  Patient was seen 10- for her gynecology exam with Dr. Sarthak Cristina with [facility] / [facility].  Are you in the first 12 months of your Medicare coverage?  Yes,  Visual Acuity:  Right Eye: within normal limits - see nursing notes   Left Eye: as above   Both Eyes: as above     Healthy Habits:     In general, how would you rate your overall health?  Excellent    Frequency of exercise:  4-5 days/week    Duration of exercise:  45-60 minutes    Do you usually eat at least 4 servings of fruit and vegetables a day, include whole grains    & fiber and avoid regularly eating high fat or \"junk\" foods?  Yes    Taking medications regularly:  Not Applicable    Barriers to taking medications:  Not applicable    Medication side effects:  Not applicable    Ability to successfully perform activities of daily living:  No assistance needed    Home Safety:  No safety concerns identified    Hearing Impairment:  No hearing concerns    In the past 6 months, have you been bothered by leaking of urine?  No    In general, how would you rate your overall mental or emotional health?  Excellent      PHQ-2 Total Score: 0    Additional concerns today:  No    Do you feel safe in your environment? No    Do you have a Health Care Directive? No: Advance care planning was reviewed with patient; patient declined at this time.    Fall risk  Fallen 2 or more times in the past year?: No  Any fall with injury in the past year?: No    Cognitive Screening   1) Repeat 3 items (Leader, Season, Table)    2) Clock draw: NORMAL  3) 3 item recall: Recalls 3 objects  Results: 3 items recalled: COGNITIVE IMPAIRMENT LESS LIKELY    Mini-CogTM Copyright S [employer]. Licensed by the author for use in [facility]; reprinted with permission (david@.[facility]). All rights reserved.  "     Do you have sleep apnea, excessive snoring or daytime drowsiness?: no    She reports that she has been a little stressed with her fathers final hours as she believes he has about 6 months to live at age 104.     Additional Information   She had a DEXA scan last year and the health maintenance list was updated. TDAP discussed today and recommended. Pneumonia-13 and 23 discussed today and she plans to get the pneumonia-13 vaccine today. She will be due for a colonoscopy in the fall.     Reviewed and updated as needed this visit by clinical staff  Tobacco  Allergies  Meds  Med Hx  Surg Hx  Fam Hx  Soc Hx      Reviewed and updated as needed this visit by Provider        Social History     Tobacco Use     Smoking status: Never Smoker     Smokeless tobacco: Never Used   Substance Use Topics     Alcohol use: Yes     Comment: rarely, 1 or 2 glasses a year     Current providers sharing in care for this patient include:   Patient Care Team:  Milton Craft MD as PCP - General (Internal Medicine)  Milton Craft MD as Assigned PCP    The following health maintenance items are reviewed in Epic and correct as of today:  Health Maintenance   Topic Date Due     DEXA  1955     FALL RISK ASSESSMENT  09/23/2018     PNEUMOCOCCAL IMMUNIZATION 65+ LOW/MEDIUM RISK (1 of 2 - PCV13) 09/23/2018     PHQ-2  01/01/2019     DTAP/TDAP/TD IMMUNIZATION (3 - Td) 02/09/2019     MEDICARE ANNUAL WELLNESS VISIT  06/08/2019     INFLUENZA VACCINE (1) 09/01/2019     COLONOSCOPY  10/12/2019     MAMMO SCREENING  10/24/2019     PAP  10/24/2020     ADVANCE CARE PLANNING  05/23/2022     LIPID  06/08/2023     HEPATITIS C SCREENING  Completed     HIV SCREENING  Completed     ZOSTER IMMUNIZATION  Completed     IPV IMMUNIZATION  Aged Out     MENINGITIS IMMUNIZATION  Aged Out     BP Readings from Last 3 Encounters:   08/06/19 116/68   06/08/18 126/84   05/23/17 102/76    Wt Readings from Last 3 Encounters:   08/06/19 55.9 kg (123 lb 3.2 oz)  "  18 55.3 kg (122 lb)   17 56.2 kg (124 lb)         Patient Active Problem List   Diagnosis     Hyperlipidemia LDL goal <160     Past Surgical History:   Procedure Laterality Date     COLONOSCOPY  10/2009     NO HISTORY OF SURGERY         Social History     Tobacco Use     Smoking status: Never Smoker     Smokeless tobacco: Never Used   Substance Use Topics     Alcohol use: Yes     Comment: rarely, 1 or 2 glasses a year     Family History   Problem Relation Age of Onset     Breast Cancer Mother          at age 49 1/2     Hypertension Father      Lipids Father          Review of Systems  Constitutional, HEENT, cardiovascular, pulmonary, GI, , musculoskeletal, neuro, skin, endocrine and psych systems are negative, except as otherwise noted.    This document serves as a record of the services and decisions personally performed and made by Milton Craft MD. It was created on his behalf by Wang Brito, a trained medical scribe. The creation of this document is based on the provider's statements to the medical scribe.  Wang Brito 10:21 AM 2019    OBJECTIVE:   /68   Pulse 71   Temp 97.1  F (36.2  C) (Oral)   Resp 18   Ht 1.613 m (5' 3.5\")   Wt 55.9 kg (123 lb 3.2 oz)   SpO2 100%   BMI 21.48 kg/m   Estimated body mass index is 21.48 kg/m  as calculated from the following:    Height as of this encounter: 1.613 m (5' 3.5\").    Weight as of this encounter: 55.9 kg (123 lb 3.2 oz).  Physical Exam  GENERAL APPEARANCE: healthy, alert and no distress  EYES: Eyes grossly normal to inspection, PERRL and conjunctivae and sclerae normal  HENT: ear canals and TM's normal, nose and mouth without ulcers or lesions, oropharynx clear and oral mucous membranes moist  NECK: no adenopathy, no asymmetry, masses, or scars and thyroid normal to palpation  RESP: lungs clear to auscultation - no rales, rhonchi or wheezes  CV: regular rate and rhythm, normal S1 S2, no S3 or S4, no murmur, click or " "rub, no peripheral edema and peripheral pulses strong  ABDOMEN: soft, nontender, no hepatosplenomegaly, no masses and bowel sounds normal  MS: no musculoskeletal defects are noted and gait is age appropriate without ataxia  SKIN: no suspicious lesions or rashes to visible skin   NEURO: Normal strength and tone, sensory exam grossly normal, mentation intact and speech normal  PSYCH: mentation appears normal and affect normal/bright    Diagnostic Test Results:  Labs reviewed in Epic  No results found for this or any previous visit (from the past 24 hour(s)).    ASSESSMENT / PLAN:   (E78.5) Hyperlipidemia LDL goal <160  (primary encounter diagnosis)  Comment: screening    Plan: Lipid panel reflex to direct LDL Fasting        Follow up as indicated on results     (Z00.00) Medicare annual wellness visit, initial  Comment: routine screening   Plan: Hemoglobin, Basic metabolic panel            (Z23) Need for vaccination with 13-polyvalent pneumococcal conjugate vaccine  Comment: administered   Plan: PNEUMOCOCCAL CONJ VACCINE 13 VALENT IM            (Z23) Need for diphtheria-tetanus-pertussis (Tdap) vaccine  Comment: due for this test / procedure / healthcare maintenance   Plan: TDAP, IM (10 - 64 YRS) - Adacel            (Z12.11) Special screening for malignant neoplasms, colon  Comment: recommended screening  Colonoscopy   Plan: GASTROENTEROLOGY ADULT REF PROCEDURE ONLY         Other; [place] [facility] [phone]            End of Life Planning:  Patient currently has an advanced directive:     COUNSELING:  Reviewed preventive health counseling, as reflected in patient instructions    Estimated body mass index is 21.48 kg/m  as calculated from the following:    Height as of this encounter: 1.613 m (5' 3.5\").    Weight as of this encounter: 55.9 kg (123 lb 3.2 oz).     reports that she has never smoked. She has never used smokeless tobacco.    Appropriate preventive services were discussed with this patient, including applicable " screening as appropriate for cardiovascular disease, diabetes, osteopenia/osteoporosis, and glaucoma.  As appropriate for age/gender, discussed screening for colorectal cancer, prostate cancer, breast cancer, and cervical cancer. Checklist reviewing preventive services available has been given to the patient.    Reviewed patients plan of care and provided an AVS. The Basic Care Plan (routine screening as documented in Health Maintenance) for Maria Del Carmen meets the Care Plan requirement. This Care Plan has been established and reviewed with the Patient.    Counseling Resources:  ATP IV Guidelines  Pooled Cohorts Equation Calculator  Breast Cancer Risk Calculator  FRAX Risk Assessment  ICSI Preventive Guidelines  Dietary Guidelines for Americans, 2010  USDA's MyPlate  ASA Prophylaxis  Lung CA Screening    The information in this document, created by the medical scribe for me, accurately reflects the services I personally performed and the decisions made by me. I have reviewed and approved this document for accuracy prior to leaving the patient care area.  August 6, 2019 10:22 AM    Milton Craft MD  [facility]    Identified Health Risks:

## 2019-08-06 NOTE — PROGRESS NOTES
Last appointment with me was 6-8-2018    Patient was seen 10- for her gynecology exam with Dr. Sarthak Cristina with [facility] / [facility].    Preventative healthcare maintenance goals including      Health Maintenance   Topic Date Due     DEXA  1955     FALL RISK ASSESSMENT  09/23/2018     PNEUMOCOCCAL IMMUNIZATION 65+ LOW/MEDIUM RISK (1 of 2 - PCV13) 09/23/2018     PHQ-2  01/01/2019     DTAP/TDAP/TD IMMUNIZATION (3 - Td) 02/09/2019     MEDICARE ANNUAL WELLNESS VISIT  06/08/2019     INFLUENZA VACCINE (1) 09/01/2019     COLONOSCOPY  10/12/2019     MAMMO SCREENING  10/24/2019     PAP  10/24/2020     ADVANCE CARE PLANNING  05/23/2022     LIPID  06/08/2023     HEPATITIS C SCREENING  Completed     HIV SCREENING  Completed     ZOSTER IMMUNIZATION  Completed     IPV IMMUNIZATION  Aged Out     MENINGITIS IMMUNIZATION  Aged Out

## 2019-08-06 NOTE — LETTER
[facility]  [address] [place] [address]. [place] [address]    August 8, 2019    Maria Del Carmen Almaraz  [address] [place] [address]          Dear Maria Del Carmen,  All of these tests are within acceptable limits. Things look good !   Enclosed is a copy of your results.     Results for orders placed or performed in visit on 08/06/19   Hemoglobin   Result Value Ref Range    Hemoglobin 14.1 11.7 - 15.7 g/dL   Basic metabolic panel   Result Value Ref Range    Sodium 141 133 - 144 mmol/L    Potassium 4.9 3.4 - 5.3 mmol/L    Chloride 106 94 - 109 mmol/L    Carbon Dioxide 30 20 - 32 mmol/L    Anion Gap 5 3 - 14 mmol/L    Glucose 95 70 - 99 mg/dL    Urea Nitrogen 14 7 - 30 mg/dL    Creatinine 0.77 0.52 - 1.04 mg/dL    GFR Estimate 80 >60 mL/min/[1.73_m2]    GFR Estimate If Black >90 >60 mL/min/[1.73_m2]    Calcium 9.6 8.5 - 10.1 mg/dL   Lipid panel reflex to direct LDL Fasting   Result Value Ref Range    Cholesterol 206 (H) <200 mg/dL    Triglycerides 134 <150 mg/dL    HDL Cholesterol 66 >49 mg/dL    LDL Cholesterol Calculated 113 (H) <100 mg/dL    Non HDL Cholesterol 140 (H) <130 mg/dL       If you have any questions or concerns, please call myself or my nurse at [phone].      Sincerely,        Milton Craft MD/ariane

## 2019-10-21 ENCOUNTER — TRANSFERRED RECORDS (OUTPATIENT)
Dept: HEALTH INFORMATION MANAGEMENT | Facility: CLINIC | Age: 64
End: 2019-10-21

## 2019-11-08 ENCOUNTER — TELEPHONE (OUTPATIENT)
Dept: FAMILY MEDICINE | Facility: CLINIC | Age: 64
End: 2019-11-08

## 2019-11-08 NOTE — TELEPHONE ENCOUNTER
Please abstract the following data from this visit with this patient into the appropriate field in Epic:    Tests that can be patient reported without a hard copy:      Other Tests found in the patient's chart through Chart Review/Care Everywhere:    Mammogram done by this group [facility] this date: 11/02/18    Note to Abstraction: If this section is blank, no results were found via Chart Review/Care Everywhere.

## 2020-02-23 ENCOUNTER — HEALTH MAINTENANCE LETTER (OUTPATIENT)
Age: 65
End: 2020-02-23

## 2020-08-09 ASSESSMENT — ACTIVITIES OF DAILY LIVING (ADL): CURRENT_FUNCTION: NO ASSISTANCE NEEDED

## 2020-08-09 ASSESSMENT — ENCOUNTER SYMPTOMS
DIARRHEA: 0
PALPITATIONS: 0
NERVOUS/ANXIOUS: 0
DIZZINESS: 0
CONSTIPATION: 0
JOINT SWELLING: 0
MYALGIAS: 0
SHORTNESS OF BREATH: 0
FREQUENCY: 0
HEMATURIA: 0
SORE THROAT: 0
NAUSEA: 0
CHILLS: 0
HEADACHES: 0
HEARTBURN: 0
ARTHRALGIAS: 0
BREAST MASS: 0
DYSURIA: 0
FEVER: 0
COUGH: 0
ABDOMINAL PAIN: 0
EYE PAIN: 0
HEMATOCHEZIA: 0
WEAKNESS: 0
PARESTHESIAS: 0

## 2020-08-11 ENCOUNTER — OFFICE VISIT (OUTPATIENT)
Dept: INTERNAL MEDICINE | Facility: CLINIC | Age: 65
End: 2020-08-11
Payer: COMMERCIAL

## 2020-08-11 VITALS
OXYGEN SATURATION: 100 % | HEART RATE: 73 BPM | DIASTOLIC BLOOD PRESSURE: 64 MMHG | WEIGHT: 123.8 LBS | SYSTOLIC BLOOD PRESSURE: 128 MMHG | BODY MASS INDEX: 21.59 KG/M2 | TEMPERATURE: 97.1 F | RESPIRATION RATE: 18 BRPM

## 2020-08-11 DIAGNOSIS — Z23 NEED FOR 23-POLYVALENT PNEUMOCOCCAL POLYSACCHARIDE VACCINE: ICD-10-CM

## 2020-08-11 DIAGNOSIS — Z13.29 SCREENING FOR HYPOTHYROIDISM: ICD-10-CM

## 2020-08-11 DIAGNOSIS — Z00.00 ENCOUNTER FOR MEDICARE ANNUAL WELLNESS EXAM: Primary | ICD-10-CM

## 2020-08-11 DIAGNOSIS — E78.5 HYPERLIPIDEMIA LDL GOAL <160: ICD-10-CM

## 2020-08-11 LAB
ANION GAP SERPL CALCULATED.3IONS-SCNC: 6 MMOL/L (ref 3–14)
BUN SERPL-MCNC: 14 MG/DL (ref 7–30)
CALCIUM SERPL-MCNC: 9.4 MG/DL (ref 8.5–10.1)
CHLORIDE SERPL-SCNC: 106 MMOL/L (ref 94–109)
CHOLEST SERPL-MCNC: 202 MG/DL
CO2 SERPL-SCNC: 29 MMOL/L (ref 20–32)
CREAT SERPL-MCNC: 0.86 MG/DL (ref 0.52–1.04)
GFR SERPL CREATININE-BSD FRML MDRD: 70 ML/MIN/{1.73_M2}
GLUCOSE SERPL-MCNC: 93 MG/DL (ref 70–99)
HDLC SERPL-MCNC: 68 MG/DL
HGB BLD-MCNC: 14.3 G/DL (ref 11.7–15.7)
LDLC SERPL CALC-MCNC: 111 MG/DL
NONHDLC SERPL-MCNC: 134 MG/DL
POTASSIUM SERPL-SCNC: 4.4 MMOL/L (ref 3.4–5.3)
SODIUM SERPL-SCNC: 141 MMOL/L (ref 133–144)
TRIGL SERPL-MCNC: 115 MG/DL
TSH SERPL DL<=0.005 MIU/L-ACNC: 0.78 MU/L (ref 0.4–4)

## 2020-08-11 PROCEDURE — 90732 PPSV23 VACC 2 YRS+ SUBQ/IM: CPT | Performed by: INTERNAL MEDICINE

## 2020-08-11 PROCEDURE — 85018 HEMOGLOBIN: CPT | Performed by: INTERNAL MEDICINE

## 2020-08-11 PROCEDURE — 36415 COLL VENOUS BLD VENIPUNCTURE: CPT | Performed by: INTERNAL MEDICINE

## 2020-08-11 PROCEDURE — 80048 BASIC METABOLIC PNL TOTAL CA: CPT | Performed by: INTERNAL MEDICINE

## 2020-08-11 PROCEDURE — 80061 LIPID PANEL: CPT | Performed by: INTERNAL MEDICINE

## 2020-08-11 PROCEDURE — G0438 PPPS, INITIAL VISIT: HCPCS | Performed by: INTERNAL MEDICINE

## 2020-08-11 PROCEDURE — 84443 ASSAY THYROID STIM HORMONE: CPT | Performed by: INTERNAL MEDICINE

## 2020-08-11 PROCEDURE — G0009 ADMIN PNEUMOCOCCAL VACCINE: HCPCS | Performed by: INTERNAL MEDICINE

## 2020-08-11 RX ORDER — CEPHALEXIN 500 MG/1
500 CAPSULE ORAL
COMMUNITY
Start: 2020-08-08 | End: 2020-08-18

## 2020-08-11 RX ORDER — MUPIROCIN 20 MG/G
OINTMENT TOPICAL
COMMUNITY
Start: 2020-08-08 | End: 2020-08-13

## 2020-08-11 ASSESSMENT — ENCOUNTER SYMPTOMS
BREAST MASS: 0
NERVOUS/ANXIOUS: 0
DYSURIA: 0
DIARRHEA: 0
HEARTBURN: 0
CONSTIPATION: 0
SHORTNESS OF BREATH: 0
NAUSEA: 0
HEMATURIA: 0
ARTHRALGIAS: 0
WEAKNESS: 0
EYE PAIN: 0
JOINT SWELLING: 0
DIZZINESS: 0
COUGH: 0
HEMATOCHEZIA: 0
FREQUENCY: 0
MYALGIAS: 0
ABDOMINAL PAIN: 0
FEVER: 0
PARESTHESIAS: 0
SORE THROAT: 0
PALPITATIONS: 0
HEADACHES: 0
CHILLS: 0

## 2020-08-11 ASSESSMENT — ACTIVITIES OF DAILY LIVING (ADL): CURRENT_FUNCTION: NO ASSISTANCE NEEDED

## 2020-08-11 NOTE — LETTER
August 13, 2020      [address] T Rufina  [address] [facility] [address]          Dear ,    We are writing to inform you of your test results.  All of these tests are within acceptable limits , things look good !       Resulted Orders   Lipid panel reflex to direct LDL Fasting   Result Value Ref Range    Cholesterol 202 (H) <200 mg/dL      Comment:      Desirable:       <200 mg/dl    Triglycerides 115 <150 mg/dL      Comment:      Fasting specimen    HDL Cholesterol 68 >49 mg/dL    LDL Cholesterol Calculated 111 (H) <100 mg/dL      Comment:      Above desirable:  100-129 mg/dl  Borderline High:  130-159 mg/dL  High:             160-189 mg/dL  Very high:       >189 mg/dl      Non HDL Cholesterol 134 (H) <130 mg/dL      Comment:      Above Desirable:  130-159 mg/dl  Borderline high:  160-189 mg/dl  High:             190-219 mg/dl  Very high:       >219 mg/dl     Basic metabolic panel   Result Value Ref Range    Sodium 141 133 - 144 mmol/L    Potassium 4.4 3.4 - 5.3 mmol/L    Chloride 106 94 - 109 mmol/L    Carbon Dioxide 29 20 - 32 mmol/L    Anion Gap 6 3 - 14 mmol/L    Glucose 93 70 - 99 mg/dL      Comment:      Fasting specimen    Urea Nitrogen 14 7 - 30 mg/dL    Creatinine 0.86 0.52 - 1.04 mg/dL    GFR Estimate 70 >60 mL/min/[1.73_m2]      Comment:      Non  GFR Calc  Starting 12/18/2018, serum creatinine based estimated GFR (eGFR) will be   calculated using the Chronic Kidney Disease Epidemiology Collaboration   (CKD-EPI) equation.      GFR Estimate If Black 81 >60 mL/min/[1.73_m2]      Comment:       GFR Calc  Starting 12/18/2018, serum creatinine based estimated GFR (eGFR) will be   calculated using the Chronic Kidney Disease Epidemiology Collaboration   (CKD-EPI) equation.      Calcium 9.4 8.5 - 10.1 mg/dL   Hemoglobin   Result Value Ref Range    Hemoglobin 14.3 11.7 - 15.7 g/dL   TSH with free T4 reflex   Result Value Ref Range    TSH 0.78 0.40 - 4.00 mU/L       If you have  any questions or concerns, please call the clinic at the number listed above.       Sincerely,        Milton Craft MD

## 2020-08-11 NOTE — PATIENT INSTRUCTIONS
Nice to see you !    The rash on your left arm looks suspicious for a hyper-sensitivity reaction  / allergic reaction . In additional to the cephALEXin (KEFLEX) that was prescribed, I am recommending you can treat this with     1. Cortizone-10 over the counter steroid cream   2. Topical diphenhydrAMINE (BENADRYL) cream following the itching

## 2020-08-11 NOTE — PROGRESS NOTES
"SUBJECTIVE:   Maria Del Carmen Almaraz is a 64 year old female who presents for Preventive Visit.      Are you in the first 12 months of your Medicare coverage?  No    Healthy Habits:     In general, how would you rate your overall health?  Excellent    Frequency of exercise:  4-5 days/week    Duration of exercise:  45-60 minutes    Do you usually eat at least 4 servings of fruit and vegetables a day, include whole grains    & fiber and avoid regularly eating high fat or \"junk\" foods?  Yes    Taking medications regularly:  Yes    Medication side effects:  Not applicable    Ability to successfully perform activities of daily living:  No assistance needed    Home Safety:  No safety concerns identified    Hearing Impairment:  No hearing concerns    In the past 6 months, have you been bothered by leaking of urine? Yes    In general, how would you rate your overall mental or emotional health?  Good      PHQ-2 Total Score: 1    Additional concerns today:  No    Father  at the age of 105 in 2020.    Wt Readings from Last 5 Encounters:   20 56.2 kg (123 lb 12.8 oz)   19 55.9 kg (123 lb 3.2 oz)   18 55.3 kg (122 lb)   17 56.2 kg (124 lb)   16 56.7 kg (125 lb)     Body mass index is 21.59 kg/m .      Do you feel safe in your environment? Yes    Have you ever done Advance Care Planning? (For example, a Health Directive, POLST, or a discussion with a medical provider or your loved ones about your wishes): No, advance care planning information given to patient to review.  Advanced care planning was discussed at today's visit.      Fall risk  Fallen 2 or more times in the past year?: No  Any fall with injury in the past year?: No       Cognitive Screening   1) Repeat 3 items (Leader, Season, Table)    2) Clock draw: NORMAL  3) 3 item recall: Recalls 3 objects  Results: 3 items recalled: COGNITIVE IMPAIRMENT LESS LIKELY    Mini-CogTM Copyright [employer]. Licensed by the author for use in [employer] " Services; reprinted with permission (soob@[facility]). All rights reserved.      Do you have sleep apnea, excessive snoring or daytime drowsiness?: no    Reviewed and updated as needed this visit by clinical staff  Tobacco  Allergies  Meds  Med Hx  Surg Hx  Fam Hx  Soc Hx        Reviewed and updated as needed this visit by Provider        Social History     Tobacco Use     Smoking status: Never Smoker     Smokeless tobacco: Never Used   Substance Use Topics     Alcohol use: Yes     Comment: rarely, 1 or 2 glasses a year     If you drink alcohol do you typically have >3 drinks per day or >7 drinks per week? No    Alcohol Use 8/9/2020   Prescreen: >3 drinks/day or >7 drinks/week? Not Applicable   Prescreen: >3 drinks/day or >7 drinks/week? -   No flowsheet data found.      Current providers sharing in care for this patient include:   Patient Care Team:  Milton Craft MD as PCP - General (Internal Medicine)  Milton Craft MD as Assigned PCP    The following health maintenance items are reviewed in Epic and correct as of today:  Health Maintenance   Topic Date Due     FALL RISK ASSESSMENT  08/06/2020     MEDICARE ANNUAL WELLNESS VISIT  08/06/2020     PNEUMOCOCCAL IMMUNIZATION 65+ LOW/MEDIUM RISK (2 of 2 - PPSV23) 08/06/2020     INFLUENZA VACCINE (1) 09/01/2020     MAMMO SCREENING  10/31/2020     ADVANCE CARE PLANNING  05/23/2022     LIPID  08/06/2024     DTAP/TDAP/TD IMMUNIZATION (4 - Td) 08/06/2029     COLORECTAL CANCER SCREENING  10/21/2029     DEXA  Completed     HEPATITIS C SCREENING  Completed     PHQ-2  Completed     ZOSTER IMMUNIZATION  Completed     IPV IMMUNIZATION  Aged Out     MENINGITIS IMMUNIZATION  Aged Out     Due for repeat colonoscopy in 3 years secondary to the finding of multiple colon polyps  Current with mammogram as of 11/2019, sees obstetrics and gynecology with [facility], see care everywhere      The 10-year ASCVD risk score ([place] DC Jr., et al., 2013) is: 5.9%    Values used to  calculate the score:      Age: 64 years      Sex: Female      Is Non- : No      Diabetic: No      Tobacco smoker: No      Systolic Blood Pressure: 128 mmHg      Is BP treated: No      HDL Cholesterol: 66 mg/dL      Total Cholesterol: 206 mg/dL      Lab work is in process  Labs reviewed in EPIC  BP Readings from Last 3 Encounters:   20 128/64   19 116/68   18 126/84    Wt Readings from Last 3 Encounters:   20 56.2 kg (123 lb 12.8 oz)   19 55.9 kg (123 lb 3.2 oz)   18 55.3 kg (122 lb)                  Patient Active Problem List   Diagnosis     Hyperlipidemia LDL goal <160     Past Surgical History:   Procedure Laterality Date     COLONOSCOPY  10/2009     NO HISTORY OF SURGERY         Social History     Tobacco Use     Smoking status: Never Smoker     Smokeless tobacco: Never Used   Substance Use Topics     Alcohol use: Never     Comment: rarely, 1 or 2 glasses a year     Family History   Problem Relation Age of Onset     Breast Cancer Mother          at age 49 1/2     Hypertension Father      Lipids Father      Hyperlipidemia Father          Current Outpatient Medications   Medication Sig Dispense Refill     Calcium-Vitamin D-Vitamin K 500-100-40 MG-UNT-MCG CHEW Take 1 tablet by mouth       cephALEXin (KEFLEX) 500 MG capsule 500 mg       fish oil-omega-3 fatty acids 1000 MG capsule        Ibuprofen (ADVIL PO) Take by mouth as needed for moderate pain       Loratadine (CLARITIN PO)        Multiple Vitamin (MULTI-VITAMINS) TABS Take 1 tablet by mouth       mupirocin (BACTROBAN) 2 % external ointment        No Known Allergies  Pneumonia Vaccine:Adults age 65+ who have not received previous Pneumovax (PPSV23) or PCV13 as an adult: Should first be given PCV13 AND then should be given PPSV23 6-12 months after PCV13  Mammogram Screening: Mammogram Screening: Patient over age 50, mutual decision to screen reflected in health maintenance.    Review of Systems  "  Constitutional: Negative for chills and fever.   HENT: Negative for congestion, ear pain, hearing loss and sore throat.    Eyes: Negative for pain and visual disturbance.   Respiratory: Negative for cough and shortness of breath.    Cardiovascular: Negative for chest pain, palpitations and peripheral edema.   Gastrointestinal: Negative for abdominal pain, constipation, diarrhea, heartburn, hematochezia and nausea.   Breasts:  Negative for tenderness, breast mass and discharge.   Genitourinary: Positive for urgency. Negative for dysuria, frequency, genital sores, hematuria, pelvic pain, vaginal bleeding and vaginal discharge.   Musculoskeletal: Negative for arthralgias, joint swelling and myalgias.   Skin: Positive for rash.   Neurological: Negative for dizziness, weakness, headaches and paresthesias.   Psychiatric/Behavioral: Negative for mood changes. The patient is not nervous/anxious.      Constitutional, HEENT, cardiovascular, pulmonary, gi and gu systems are negative, except as otherwise noted.    BP Readings from Last 6 Encounters:   08/11/20 128/64   08/06/19 116/68   06/08/18 126/84   05/23/17 102/76   06/30/16 122/76   06/28/16 122/68         OBJECTIVE:   /64   Pulse 73   Temp 97.1  F (36.2  C) (Oral)   Resp 18   Wt 56.2 kg (123 lb 12.8 oz)   SpO2 100%   BMI 21.59 kg/m   Estimated body mass index is 21.59 kg/m  as calculated from the following:    Height as of 8/6/19: 1.613 m (5' 3.5\").    Weight as of this encounter: 56.2 kg (123 lb 12.8 oz).  Physical Exam  GENERAL: healthy, alert and no distress  EYES: Eyes grossly normal to inspection, PERRL and conjunctivae and sclerae normal  HENT: ear canals and TM's normal, nose and mouth without ulcers or lesions  NECK: no adenopathy, no asymmetry, masses, or scars and thyroid normal to palpation  RESP: lungs clear to auscultation - no rales, rhonchi or wheezes  BREAST: normal without masses, tenderness or nipple discharge and no palpable axillary " "masses or adenopathy  CV: regular rate and rhythm, normal S1 S2, no S3 or S4, no murmur, click or rub, no peripheral edema and peripheral pulses strong  ABDOMEN: soft, nontender, no hepatosplenomegaly, no masses and bowel sounds normal  MS: no gross musculoskeletal defects noted, no edema  SKIN: no suspicious lesions or rashes, there IS a rash on left elbow most consistent with hyper-sensitivity reaction  To a bug bite   NEURO: Normal strength and tone, mentation intact and speech normal  PSYCH: mentation appears normal, affect normal/bright    Diagnostic Test Results:  Labs reviewed in Epic  No orders of the defined types were placed in this encounter.        ASSESSMENT / PLAN:   (Z00.00) Encounter for Medicare annual wellness exam  (primary encounter diagnosis)  Comment: routine screening   Plan: Basic metabolic panel, Hemoglobin        See orders section of this encounter     (Z23) Need for 23-polyvalent pneumococcal polysaccharide vaccine  Comment: already had the Prevnar , aka, Pneumococcal 13-valent Conjugate Vaccine    Plan: Pneumococcal vaccine 23 valent PPSV23          (Pneumovax) [43170]            (E78.5) Hyperlipidemia LDL goal <160  Comment: due for recheck although her framingham risk index score doesn't rise to the level of hypercholesterolemia treatment being recommended   Plan: Lipid panel reflex to direct LDL Fasting            (Z13.29) Screening for hypothyroidism  Comment: routine screening   Plan: TSH with free T4 reflex        Follow up as indicated on results       COUNSELING:  Reviewed preventive health counseling, as reflected in patient instructions    Estimated body mass index is 21.59 kg/m  as calculated from the following:    Height as of 8/6/19: 1.613 m (5' 3.5\").    Weight as of this encounter: 56.2 kg (123 lb 12.8 oz).         reports that she has never smoked. She has never used smokeless tobacco.      Appropriate preventive services were discussed with this patient, including " applicable screening as appropriate for cardiovascular disease, diabetes, osteopenia/osteoporosis, and glaucoma.  As appropriate for age/gender, discussed screening for colorectal cancer, prostate cancer, breast cancer, and cervical cancer. Checklist reviewing preventive services available has been given to the patient.    Reviewed patients plan of care and provided an AVS. The Basic Care Plan (routine screening as documented in Health Maintenance) for Joyce meets the Care Plan requirement. This Care Plan has been established and reviewed with the Patient.    Counseling Resources:  ATP IV Guidelines  Pooled Cohorts Equation Calculator  Breast Cancer Risk Calculator  FRAX Risk Assessment  ICSI Preventive Guidelines  Dietary Guidelines for Americans, 2010  USDA's MyPlate  ASA Prophylaxis  Lung CA Screening    Milton Craft MD  [facility]    Identified Health Risks:

## 2020-12-12 ENCOUNTER — HEALTH MAINTENANCE LETTER (OUTPATIENT)
Age: 65
End: 2020-12-12

## 2021-01-15 ENCOUNTER — HEALTH MAINTENANCE LETTER (OUTPATIENT)
Age: 66
End: 2021-01-15

## 2021-09-15 ASSESSMENT — ENCOUNTER SYMPTOMS
HEMATOCHEZIA: 0
EYE PAIN: 0
ABDOMINAL PAIN: 0
SHORTNESS OF BREATH: 0
SORE THROAT: 0
CONSTIPATION: 0
FREQUENCY: 0
ARTHRALGIAS: 0
DYSURIA: 0
WEAKNESS: 0
HEADACHES: 0
CHILLS: 0
PARESTHESIAS: 0
MYALGIAS: 0
JOINT SWELLING: 0
NERVOUS/ANXIOUS: 0
HEARTBURN: 0
FEVER: 0
BREAST MASS: 0
COUGH: 0
DIARRHEA: 0
PALPITATIONS: 0
NAUSEA: 0
DIZZINESS: 0

## 2021-09-15 ASSESSMENT — ACTIVITIES OF DAILY LIVING (ADL): CURRENT_FUNCTION: NO ASSISTANCE NEEDED

## 2021-09-17 ENCOUNTER — OFFICE VISIT (OUTPATIENT)
Dept: INTERNAL MEDICINE | Facility: CLINIC | Age: 66
End: 2021-09-17
Payer: COMMERCIAL

## 2021-09-17 VITALS
DIASTOLIC BLOOD PRESSURE: 66 MMHG | WEIGHT: 123 LBS | TEMPERATURE: 97.1 F | HEART RATE: 77 BPM | SYSTOLIC BLOOD PRESSURE: 122 MMHG | RESPIRATION RATE: 12 BRPM | BODY MASS INDEX: 21 KG/M2 | OXYGEN SATURATION: 100 % | HEIGHT: 64 IN

## 2021-09-17 DIAGNOSIS — Z23 NEEDS FLU SHOT: ICD-10-CM

## 2021-09-17 DIAGNOSIS — Z00.00 ENCOUNTER FOR MEDICARE ANNUAL WELLNESS EXAM: Primary | ICD-10-CM

## 2021-09-17 DIAGNOSIS — E78.5 HYPERLIPIDEMIA LDL GOAL <160: ICD-10-CM

## 2021-09-17 LAB
ANION GAP SERPL CALCULATED.3IONS-SCNC: 5 MMOL/L (ref 3–14)
BUN SERPL-MCNC: 12 MG/DL (ref 7–30)
CALCIUM SERPL-MCNC: 9 MG/DL (ref 8.5–10.1)
CHLORIDE BLD-SCNC: 104 MMOL/L (ref 94–109)
CHOLEST SERPL-MCNC: 227 MG/DL
CO2 SERPL-SCNC: 29 MMOL/L (ref 20–32)
CREAT SERPL-MCNC: 0.83 MG/DL (ref 0.52–1.04)
FASTING STATUS PATIENT QL REPORTED: YES
GFR SERPL CREATININE-BSD FRML MDRD: 73 ML/MIN/1.73M2
GLUCOSE BLD-MCNC: 89 MG/DL (ref 70–99)
HDLC SERPL-MCNC: 64 MG/DL
HGB BLD-MCNC: 14.1 G/DL (ref 11.7–15.7)
LDLC SERPL CALC-MCNC: 134 MG/DL
NONHDLC SERPL-MCNC: 163 MG/DL
POTASSIUM BLD-SCNC: 4 MMOL/L (ref 3.4–5.3)
SODIUM SERPL-SCNC: 138 MMOL/L (ref 133–144)
TRIGL SERPL-MCNC: 143 MG/DL

## 2021-09-17 PROCEDURE — 85018 HEMOGLOBIN: CPT | Performed by: INTERNAL MEDICINE

## 2021-09-17 PROCEDURE — 80048 BASIC METABOLIC PNL TOTAL CA: CPT | Performed by: INTERNAL MEDICINE

## 2021-09-17 PROCEDURE — 36415 COLL VENOUS BLD VENIPUNCTURE: CPT | Performed by: INTERNAL MEDICINE

## 2021-09-17 PROCEDURE — 80061 LIPID PANEL: CPT | Performed by: INTERNAL MEDICINE

## 2021-09-17 PROCEDURE — 99397 PER PM REEVAL EST PAT 65+ YR: CPT | Performed by: INTERNAL MEDICINE

## 2021-09-17 ASSESSMENT — ENCOUNTER SYMPTOMS
HEARTBURN: 0
MYALGIAS: 0
COUGH: 0
DIZZINESS: 0
NERVOUS/ANXIOUS: 0
CHILLS: 0
PARESTHESIAS: 0
HEMATOCHEZIA: 0
WEAKNESS: 0
JOINT SWELLING: 0
NAUSEA: 0
DIARRHEA: 0
ABDOMINAL PAIN: 0
FEVER: 0
EYE PAIN: 0
HEADACHES: 0
DYSURIA: 0
CONSTIPATION: 0
PALPITATIONS: 0
BREAST MASS: 0
SHORTNESS OF BREATH: 0
ARTHRALGIAS: 0
SORE THROAT: 0
FREQUENCY: 0

## 2021-09-17 ASSESSMENT — MIFFLIN-ST. JEOR: SCORE: 1077.92

## 2021-09-17 ASSESSMENT — ACTIVITIES OF DAILY LIVING (ADL): CURRENT_FUNCTION: NO ASSISTANCE NEEDED

## 2021-09-17 NOTE — PATIENT INSTRUCTIONS
Patient Education   Personalized Prevention Plan  You are due for the preventive services outlined below.  Your care team is available to assist you in scheduling these services.  If you have already completed any of these items, please share that information with your care team to update in your medical record.  Health Maintenance Due   Topic Date Due     ANNUAL REVIEW OF HM ORDERS  Never done     Mammogram  10/31/2020     FALL RISK ASSESSMENT  08/11/2021     Flu Vaccine (1) 09/01/2021       Urinary Incontinence, Female (Adult)   Urinary incontinence means loss of bladder control. This problem affects many women, especially as they get older. If you have incontinence, you may be embarrassed to ask for help. But know that this problem can be treated.   Types of Incontinence  There are different types of incontinence. Two of the main types are described here. You can have more than one type.     Stress incontinence. With this type, urine leaks when pressure (stress) is put on the bladder. This may happen when you cough, sneeze, or laugh. Stress incontinence most often occurs because the pelvic floor muscles that support the bladder and urethra are weak. This can happen after pregnancy and vaginal childbirth or a hysterectomy. It can also be due to excess body weight or hormone changes.    Urge incontinence (also called overactive bladder). With this type, a sudden urge to urinate is felt often. This may happen even though there may not be much urine in the bladder. The need to urinate often during the night is common. Urge incontinence most often occurs because of bladder spasms. This may be due to bladder irritation or infection. Damage to bladder nerves or pelvic muscles, constipation, and certain medicines can also lead to urge incontinence.  Treatment depends on the cause. Further evaluation is needed to find the type you have. This will likely include an exam and certain tests. Based on the results, you and  your healthcare provider can then plan treatment. Until a diagnosis is made, the home care tips below can help ease symptoms.   Home care    Do pelvic floor muscle exercises, if they are prescribed. The pelvic floor muscles help support the bladder and urethra. Many women find that their symptoms improve when doing special exercises that strengthen these muscles. To do the exercises, contract the muscles you would use to stop your stream of urine. But do this when you re not urinating. Hold for 10 seconds, then relax. Repeat 10 to 20 times in a row, at least 3 times a day. Your healthcare provider may give you other instructions for how to do the exercises and how often.    Keep a bladder diary. This helps track how often and how much you urinate over a set period of time. Bring this diary with you to your next visit with the provider. The information can help your provider learn more about your bladder problem.    Lose weight, if advised to by your provider. Extra weight puts pressure on the bladder. Your provider can help you create a weight-loss plan that s right for you. This may include exercising more and making certain diet changes.    Don't have foods and drinks that may irritate the bladder. These can include alcohol and caffeinated drinks.    Quit smoking. Smoking and other tobacco use can lead to a long-term (chronic) cough that strains the pelvic floor muscles. Smoking may also damage the bladder and urethra. Talk with your provider about treatments or methods you can use to quit smoking.    If drinking large amounts of fluid makes you have symptoms, you may be advised to limit your fluid intake. You may also be advised to drink most of your fluids during the day and to limit fluids at night.    If you re worried about urine leakage or accidents, you may wear absorbent pads to catch urine. Change the pads often. This helps reduce discomfort. It may also reduce the risk of skin or bladder  infections.    Follow-up care  Follow up with your healthcare provider, or as directed. It may take some to find the right treatment for your problem. But healthy lifestyle changes can be made right away. These include such things as exercising on a regular basis, eating a healthy diet, losing weight (if needed), and quitting smoking. Your treatment plan may include special therapies or medicines. Certain procedures or surgery may also be options. Talk about any questions you have with your provider.   When to seek medical advice  Call the healthcare provider right away if any of these occur:    Fever of 100.4 F (38 C) or higher, or as directed by your provider    Bladder pain or fullness    Belly swelling    Nausea or vomiting    Back pain    Weakness, dizziness, or fainting  Hang last reviewed this educational content on 1/1/2020 2000-2021 [employer]. All rights reserved. This information is not intended as a substitute for professional medical care. Always follow your healthcare professional's instructions.

## 2021-09-17 NOTE — LETTER
September 20, 2021      Joyce T Rufina  [address] [facility] [place] [address]        Dear ,    We are writing to inform you of your test results.    All of these tests are within acceptable limits , things look good !         Resulted Orders   Basic metabolic panel  (Ca, Cl, CO2, Creat, Gluc, K, Na, BUN)   Result Value Ref Range    Sodium 138 133 - 144 mmol/L    Potassium 4.0 3.4 - 5.3 mmol/L    Chloride 104 94 - 109 mmol/L    Carbon Dioxide (CO2) 29 20 - 32 mmol/L    Anion Gap 5 3 - 14 mmol/L    Urea Nitrogen 12 7 - 30 mg/dL    Creatinine 0.83 0.52 - 1.04 mg/dL    Calcium 9.0 8.5 - 10.1 mg/dL    Glucose 89 70 - 99 mg/dL    GFR Estimate 73 >60 mL/min/1.73m2      Comment:      As of July 11, 2021, eGFR is calculated by the CKD-EPI creatinine equation, without race adjustment. eGFR can be influenced by muscle mass, exercise, and diet. The reported eGFR is an estimation only and is only applicable if the renal function is stable.   Hemoglobin   Result Value Ref Range    Hemoglobin 14.1 11.7 - 15.7 g/dL   Lipid panel reflex to direct LDL Non-fasting   Result Value Ref Range    Cholesterol 227 (H) <200 mg/dL    Triglycerides 143 <150 mg/dL    Direct Measure HDL 64 >=50 mg/dL    LDL Cholesterol Calculated 134 (H) <=100 mg/dL    Non HDL Cholesterol 163 (H) <130 mg/dL    Patient Fasting > 8hrs? Yes     Narrative    Cholesterol  Desirable:  <200 mg/dL    Triglycerides  Normal:  Less than 150 mg/dL  Borderline High:  150-199 mg/dL  High:  200-499 mg/dL  Very High:  Greater than or equal to 500 mg/dL    Direct Measure HDL  Female:  Greater than or equal to 50 mg/dL   Male:  Greater than or equal to 40 mg/dL    LDL Cholesterol  Desirable:  <100mg/dL  Above Desirable:  100-129 mg/dL   Borderline High:  130-159 mg/dL   High:  160-189 mg/dL   Very High:  >= 190 mg/dL    Non HDL Cholesterol  Desirable:  130 mg/dL  Above Desirable:  130-159 mg/dL  Borderline High:  160-189 mg/dL  High:  190-219 mg/dL  Very High:  Greater than or  equal to 220 mg/dL       If you have any questions or concerns, please call the clinic at the number listed above.       Sincerely,      Milton Craft MD/marian

## 2021-09-17 NOTE — PROGRESS NOTES
"SUBJECTIVE:   Maria Del Carmen Almaraz is a 65 year old female who presents for Preventive Visit.      Patient has been advised of split billing requirements and indicates understanding: Yes   Are you in the first 12 months of your Medicare coverage?  No    Healthy Habits:     In general, how would you rate your overall health?  Excellent    Frequency of exercise:  6-7 days/week    Duration of exercise:  Greater than 60 minutes    Do you usually eat at least 4 servings of fruit and vegetables a day, include whole grains    & fiber and avoid regularly eating high fat or \"junk\" foods?  Yes    Taking medications regularly:  No    Medication side effects:  Not applicable    Ability to successfully perform activities of daily living:  No assistance needed    Home Safety:  No safety concerns identified    Hearing Impairment:  No hearing concerns    In the past 6 months, have you been bothered by leaking of urine? Yes    In general, how would you rate your overall mental or emotional health?  Excellent      PHQ-2 Total Score: 1    Additional concerns today:  No    Do you feel safe in your environment? Yes    Have you ever done Advance Care Planning? (For example, a Health Directive, POLST, or a discussion with a medical provider or your loved ones about your wishes): Yes, patient states has an Advance Care Planning document and will bring a copy to the clinic.    Fall risk  Fallen 2 or more times in the past year?: No  Any fall with injury in the past year?: No    Cognitive Screening   1) Repeat 3 items (Leader, Season, Table)    2) Clock draw: NORMAL  3) 3 item recall: Recalls 3 objects  Results: 3 items recalled: COGNITIVE IMPAIRMENT LESS LIKELY    Mini-CogTM Copyright PAULA Corodn. Licensed by the author for use in [facility]; reprinted with permission ([email or web address]). All rights reserved.      Do you have sleep apnea, excessive snoring or daytime drowsiness?: no    Reviewed and updated as needed this visit by clinical " staff  Tobacco  Allergies  Meds   Med Hx  Surg Hx  Fam Hx  Soc Hx        Reviewed and updated as needed this visit by Provider                Social History     Tobacco Use     Smoking status: Never Smoker     Smokeless tobacco: Never Used   Substance Use Topics     Alcohol use: Never     Comment: rarely, 1 or 2 glasses a year       Alcohol Use 9/15/2021   Prescreen: >3 drinks/day or >7 drinks/week? Not Applicable   Prescreen: >3 drinks/day or >7 drinks/week? -       Current providers sharing in care for this patient include:   Patient Care Team:  Milton Craft MD as PCP - General (Internal Medicine)  Milton Craft MD as Assigned PCP    The following health maintenance items are reviewed in Epic and correct as of today:  Health Maintenance Due   Topic Date Due     ANNUAL REVIEW OF HM ORDERS  Never done     MAMMO SCREENING  10/31/2020     FALL RISK ASSESSMENT  2021     INFLUENZA VACCINE (1) 2021     Lab work is in process  Labs reviewed in EPIC  BP Readings from Last 3 Encounters:   21 122/66   20 128/64   19 116/68    Wt Readings from Last 3 Encounters:   21 55.8 kg (123 lb)   20 56.2 kg (123 lb 12.8 oz)   19 55.9 kg (123 lb 3.2 oz)                  Patient Active Problem List   Diagnosis     Hyperlipidemia LDL goal <160     Past Surgical History:   Procedure Laterality Date     COLONOSCOPY  10/2009     NO HISTORY OF SURGERY         Social History     Tobacco Use     Smoking status: Never Smoker     Smokeless tobacco: Never Used   Substance Use Topics     Alcohol use: Never     Comment: rarely, 1 or 2 glasses a year     Family History   Problem Relation Age of Onset     Breast Cancer Mother          at age 49 1/2     Hypertension Father      Lipids Father      Hyperlipidemia Father          Current Outpatient Medications   Medication Sig Dispense Refill     Calcium-Vitamin D-Vitamin K 500-100-40 MG-UNT-MCG CHEW Take 1 tablet by mouth       fish oil-omega-3  fatty acids 1000 MG capsule        Ibuprofen (ADVIL PO) Take by mouth as needed for moderate pain       Loratadine (CLARITIN PO)        Multiple Vitamin (MULTI-VITAMINS) TABS Take 1 tablet by mouth       No Known Allergies  Mammogram Screening: Mammogram Screening: Recommended mammography every 1-2 years with patient discussion and risk factor consideration    S-7:   Breast CA Risk Assessment (FHS-7) 9/15/2021   Did any of your first-degree relatives have breast or ovarian cancer? Yes   Did any of your relatives have bilateral breast cancer? No   Did any man in your family have breast cancer? No   Did any woman in your family have breast and ovarian cancer? No   Did any woman in your family have breast cancer before age 50 y? Yes   Do you have 2 or more relatives with breast and/or ovarian cancer? No   Do you have 2 or more relatives with breast and/or bowel cancer? No        continues with annual mammograms  Pertinent mammograms are reviewed under the imaging tab.    Review of Systems   Constitutional: Negative for chills and fever.   HENT: Negative for congestion, ear pain, hearing loss and sore throat.    Eyes: Negative for pain and visual disturbance.   Respiratory: Negative for cough and shortness of breath.    Cardiovascular: Negative for chest pain, palpitations and peripheral edema.   Gastrointestinal: Negative for abdominal pain, constipation, diarrhea, heartburn, hematochezia and nausea.   Breasts:  Negative for tenderness, breast mass and discharge.   Genitourinary: Negative for dysuria, frequency, genital sores, pelvic pain, urgency, vaginal bleeding and vaginal discharge.   Musculoskeletal: Negative for arthralgias, joint swelling and myalgias.   Skin: Negative for rash.   Neurological: Negative for dizziness, weakness, headaches and paresthesias.   Psychiatric/Behavioral: Negative for mood changes. The patient is not nervous/anxious.      Constitutional, HEENT, cardiovascular, pulmonary, gi and gu  "systems are negative, except as otherwise noted.    OBJECTIVE:   /85   Pulse 77   Temp 97.1  F (36.2  C) (Oral)   Resp 12   Ht 1.626 m (5' 4\")   Wt 55.8 kg (123 lb)   SpO2 100%   BMI 21.11 kg/m   Estimated body mass index is 21.11 kg/m  as calculated from the following:    Height as of this encounter: 1.626 m (5' 4\").    Weight as of this encounter: 55.8 kg (123 lb).  Physical Exam  GENERAL: healthy, alert and no distress  EYES: Eyes grossly normal to inspection, PERRL and conjunctivae and sclerae normal  HENT: ear canals and TM's normal, nose and mouth without ulcers or lesions  NECK: no adenopathy, no asymmetry, masses, or scars and thyroid normal to palpation  RESP: lungs clear to auscultation - no rales, rhonchi or wheezes  CV: regular rate and rhythm, normal S1 S2, no S3 or S4, no murmur, click or rub, no peripheral edema and peripheral pulses strong  ABDOMEN: soft, nontender, no hepatosplenomegaly, no masses and bowel sounds normal  MS: no gross musculoskeletal defects noted, no edema  SKIN: no suspicious lesions or rashes  NEURO: Normal strength and tone, mentation intact and speech normal  PSYCH: mentation appears normal, affect normal/bright    Diagnostic Test Results:  Labs reviewed in Epic    ASSESSMENT / PLAN:   Maria Del Carmen was seen today for wellness visit.    Diagnoses and all orders for this visit:    Encounter for Medicare annual wellness exam  -     Hemoglobin; Future  -     Basic metabolic panel  (Ca, Cl, CO2, Creat, Gluc, K, Na, BUN); Future  -     Basic metabolic panel  (Ca, Cl, CO2, Creat, Gluc, K, Na, BUN)  -     Hemoglobin    Needs flu shot  -     INFLUENZA, QUAD, HIGH DOSE, PF, 65YR + (FLUZONE HD)    Hyperlipidemia LDL goal <160  -     Lipid panel reflex to direct LDL Non-fasting; Future  -     Lipid panel reflex to direct LDL Non-fasting        Patient has been advised of split billing requirements and indicates understanding: Yes  COUNSELING:  Reviewed preventive health counseling, as " "reflected in patient instructions    Estimated body mass index is 21.11 kg/m  as calculated from the following:    Height as of this encounter: 1.626 m (5' 4\").    Weight as of this encounter: 55.8 kg (123 lb).        She reports that she has never smoked. She has never used smokeless tobacco.      Appropriate preventive services were discussed with this patient, including applicable screening as appropriate for cardiovascular disease, diabetes, osteopenia/osteoporosis, and glaucoma.  As appropriate for age/gender, discussed screening for colorectal cancer, prostate cancer, breast cancer, and cervical cancer. Checklist reviewing preventive services available has been given to the patient.    Reviewed patients plan of care and provided an AVS. The Basic Care Plan (routine screening as documented in Health Maintenance) for Joyce meets the Care Plan requirement. This Care Plan has been established and reviewed with the Patient.    Counseling Resources:  ATP IV Guidelines  Pooled Cohorts Equation Calculator  Breast Cancer Risk Calculator  Breast Cancer: Medication to Reduce Risk  FRAX Risk Assessment  ICSI Preventive Guidelines  Dietary Guidelines for Americans, 2010  USDA's MyPlate  ASA Prophylaxis  Lung CA Screening    Milton Craft MD  [facility]    Identified Health Risks:  "

## 2021-09-26 ENCOUNTER — HEALTH MAINTENANCE LETTER (OUTPATIENT)
Age: 66
End: 2021-09-26

## 2022-09-20 ASSESSMENT — ENCOUNTER SYMPTOMS
ARTHRALGIAS: 0
SHORTNESS OF BREATH: 0
FREQUENCY: 0
PALPITATIONS: 0
SORE THROAT: 0
CONSTIPATION: 0
NERVOUS/ANXIOUS: 0
MYALGIAS: 0
HEMATURIA: 0
DIARRHEA: 0
COUGH: 0
DIZZINESS: 0
HEMATOCHEZIA: 0
NAUSEA: 0
BREAST MASS: 0
EYE PAIN: 0
HEARTBURN: 0
JOINT SWELLING: 0
CHILLS: 0
DYSURIA: 0
PARESTHESIAS: 0
ABDOMINAL PAIN: 0
WEAKNESS: 0
HEADACHES: 0
FEVER: 0

## 2022-09-20 ASSESSMENT — ACTIVITIES OF DAILY LIVING (ADL): CURRENT_FUNCTION: NO ASSISTANCE NEEDED

## 2022-09-23 ENCOUNTER — OFFICE VISIT (OUTPATIENT)
Dept: INTERNAL MEDICINE | Facility: CLINIC | Age: 67
End: 2022-09-23
Payer: COMMERCIAL

## 2022-09-23 VITALS
BODY MASS INDEX: 20.73 KG/M2 | OXYGEN SATURATION: 100 % | SYSTOLIC BLOOD PRESSURE: 130 MMHG | HEIGHT: 64 IN | HEART RATE: 71 BPM | TEMPERATURE: 97.8 F | DIASTOLIC BLOOD PRESSURE: 84 MMHG | WEIGHT: 121.4 LBS

## 2022-09-23 DIAGNOSIS — Z00.00 ENCOUNTER FOR SUBSEQUENT ANNUAL WELLNESS VISIT (AWV) IN MEDICARE PATIENT: Primary | ICD-10-CM

## 2022-09-23 DIAGNOSIS — E78.5 HYPERLIPIDEMIA LDL GOAL <160: ICD-10-CM

## 2022-09-23 LAB
ANION GAP SERPL CALCULATED.3IONS-SCNC: 3 MMOL/L (ref 3–14)
BUN SERPL-MCNC: 16 MG/DL (ref 7–30)
CALCIUM SERPL-MCNC: 9.7 MG/DL (ref 8.5–10.1)
CHLORIDE BLD-SCNC: 105 MMOL/L (ref 94–109)
CHOLEST SERPL-MCNC: 223 MG/DL
CO2 SERPL-SCNC: 30 MMOL/L (ref 20–32)
CREAT SERPL-MCNC: 0.83 MG/DL (ref 0.52–1.04)
FASTING STATUS PATIENT QL REPORTED: YES
GFR SERPL CREATININE-BSD FRML MDRD: 76 ML/MIN/1.73M2
GLUCOSE BLD-MCNC: 100 MG/DL (ref 70–99)
HDLC SERPL-MCNC: 69 MG/DL
HGB BLD-MCNC: 14.2 G/DL (ref 11.7–15.7)
LDLC SERPL CALC-MCNC: 135 MG/DL
NONHDLC SERPL-MCNC: 154 MG/DL
POTASSIUM BLD-SCNC: 4.3 MMOL/L (ref 3.4–5.3)
SODIUM SERPL-SCNC: 138 MMOL/L (ref 133–144)
TRIGL SERPL-MCNC: 96 MG/DL

## 2022-09-23 PROCEDURE — G0439 PPPS, SUBSEQ VISIT: HCPCS | Performed by: INTERNAL MEDICINE

## 2022-09-23 PROCEDURE — 80061 LIPID PANEL: CPT | Performed by: INTERNAL MEDICINE

## 2022-09-23 PROCEDURE — 85018 HEMOGLOBIN: CPT | Performed by: INTERNAL MEDICINE

## 2022-09-23 PROCEDURE — 80048 BASIC METABOLIC PNL TOTAL CA: CPT | Performed by: INTERNAL MEDICINE

## 2022-09-23 PROCEDURE — 36415 COLL VENOUS BLD VENIPUNCTURE: CPT | Performed by: INTERNAL MEDICINE

## 2022-09-23 ASSESSMENT — ENCOUNTER SYMPTOMS
NERVOUS/ANXIOUS: 0
DYSURIA: 0
MYALGIAS: 0
CHILLS: 0
FREQUENCY: 0
PARESTHESIAS: 0
PALPITATIONS: 0
DIZZINESS: 0
HEADACHES: 0
HEMATURIA: 0
NAUSEA: 0
ABDOMINAL PAIN: 0
SORE THROAT: 0
COUGH: 0
DIARRHEA: 0
BREAST MASS: 0
FEVER: 0
ARTHRALGIAS: 0
HEARTBURN: 0
SHORTNESS OF BREATH: 0
HEMATOCHEZIA: 0
EYE PAIN: 0
JOINT SWELLING: 0
WEAKNESS: 0
CONSTIPATION: 0

## 2022-09-23 ASSESSMENT — ACTIVITIES OF DAILY LIVING (ADL): CURRENT_FUNCTION: NO ASSISTANCE NEEDED

## 2022-09-23 NOTE — PROGRESS NOTES
"SUBJECTIVE:   Maria Del Carmen is a 67 year old who presents for Preventive Visit.    Patient has been advised of split billing requirements and indicates understanding: Yes  Are you in the first 12 months of your Medicare coverage?  No    Healthy Habits:     In general, how would you rate your overall health?  Excellent    Frequency of exercise:  4-5 days/week    Duration of exercise:  Greater than 60 minutes    Do you usually eat at least 4 servings of fruit and vegetables a day, include whole grains    & fiber and avoid regularly eating high fat or \"junk\" foods?  Yes    Taking medications regularly:  Yes    Medication side effects:  Not applicable    Ability to successfully perform activities of daily living:  No assistance needed    Home Safety:  No safety concerns identified    Hearing Impairment:  No hearing concerns    In the past 6 months, have you been bothered by leaking of urine? Yes    In general, how would you rate your overall mental or emotional health?  Excellent      PHQ-2 Total Score: 0    Additional concerns today:  No    Do you feel safe in your environment? Yes    Have you ever done Advance Care Planning? (For example, a Health Directive, POLST, or a discussion with a medical provider or your loved ones about your wishes): No, advance care planning information given to patient to review.  Patient plans to discuss their wishes with loved ones or provider.      Last appointment with me was 1 year ago, 9/2021  Health Maintenance Due   Topic Date Due     ANNUAL REVIEW OF HM ORDERS  Never done     COVID-19 Vaccine (5 - Booster for Moderna series) 06/28/2022     INFLUENZA VACCINE (1) 09/01/2022    extremely healthy woman with essentially no medical issues whatsoever   Answers for HPI/ROS submitted by the patient on 9/20/2022  In general, how would you rate your overall physical health?: excellent  Frequency of exercise:: 4-5 days/week  Do you usually eat at least 4 servings of fruit and vegetables a day, include " "whole grains & fiber, and avoid regularly eating high fat or \"junk\" foods? : Yes  Taking medications regularly:: Yes  Medication side effects:: Not applicable  Activities of Daily Living: no assistance needed  Home safety: no safety concerns identified  Hearing Impairment:: no hearing concerns  In the past 6 months, have you been bothered by leaking of urine?: Yes  abdominal pain: No  Blood in stool: No  Blood in urine: No  chest pain: No  chills: No  congestion: No  constipation: No  cough: No  diarrhea: No  dizziness: No  ear pain: No  eye pain: No  nervous/anxious: No  fever: No  frequency: No  genital sores: No  headaches: No  hearing loss: No  heartburn: No  arthralgias: No  joint swelling: No  peripheral edema: No  mood changes: No  myalgias: No  nausea: No  dysuria: No  palpitations: No  Skin sensation changes: No  sore throat: No  urgency: No  rash: No  shortness of breath: No  visual disturbance: No  weakness: No  pelvic pain: No  vaginal bleeding: No  vaginal discharge: No  tenderness: No  breast mass: No  breast discharge: No  In general, how would you rate your overall mental or emotional health?: excellent  Additional concerns today:: No  Duration of exercise:: Greater than 60 minutes      Fall risk  Fallen 2 or more times in the past year?: No  Any fall with injury in the past year?: No    Cognitive Screening   1) Repeat 3 items (Leader, Season, Table)    2) Clock draw: NORMAL  3) 3 item recall: Recalls 3 objects  Results: 3 items recalled: COGNITIVE IMPAIRMENT LESS LIKELY    Mini-CogTM Copyright PAULA Cordon. Licensed by the author for use in [facility]; reprinted with permission ([email or web address]). All rights reserved.      Do you have sleep apnea, excessive snoring or daytime drowsiness?: no    Reviewed and updated as needed this visit by clinical staff   Tobacco  Allergies  Meds   Med Hx  Surg Hx  Fam Hx  Soc Hx          Reviewed and updated as needed this visit by Provider     Meds     "           Social History     Tobacco Use     Smoking status: Never Smoker     Smokeless tobacco: Never Used   Substance Use Topics     Alcohol use: Never     Comment: rarely, 1 or 2 glasses a year     If you drink alcohol do you typically have >3 drinks per day or >7 drinks per week? No    Alcohol Use 9/20/2022   Prescreen: >3 drinks/day or >7 drinks/week? Not Applicable   Prescreen: >3 drinks/day or >7 drinks/week? -       Current providers sharing in care for this patient include:   Patient Care Team:  Milton Craft MD as PCP - General (Internal Medicine)  Milton Craft MD as Assigned PCP    The following health maintenance items are reviewed in Epic and correct as of today:  Health Maintenance   Topic Date Due     ANNUAL REVIEW OF HM ORDERS  Never done     COVID-19 Vaccine (5 - Booster for Moderna series) 06/28/2022     INFLUENZA VACCINE (1) 09/01/2022     MEDICARE ANNUAL WELLNESS VISIT  11/11/2022     FALL RISK ASSESSMENT  09/23/2023     MAMMO SCREENING  12/03/2023     LIPID  09/17/2026     ADVANCE CARE PLANNING  09/17/2026     DTAP/TDAP/TD IMMUNIZATION (4 - Td or Tdap) 08/06/2029     COLORECTAL CANCER SCREENING  10/21/2029     DEXA  10/31/2033     HEPATITIS C SCREENING  Completed     PHQ-2 (once per calendar year)  Completed     Pneumococcal Vaccine: 65+ Years  Completed     ZOSTER IMMUNIZATION  Completed     IPV IMMUNIZATION  Aged Out     MENINGITIS IMMUNIZATION  Aged Out     HEPATITIS B IMMUNIZATION  Aged Out     Lab work is in process  Labs reviewed in EPIC  BP Readings from Last 3 Encounters:   09/23/22 130/84   09/17/21 122/66   08/11/20 128/64    Wt Readings from Last 3 Encounters:   09/23/22 55.1 kg (121 lb 6.4 oz)   09/17/21 55.8 kg (123 lb)   08/11/20 56.2 kg (123 lb 12.8 oz)                  Patient Active Problem List   Diagnosis     Hyperlipidemia LDL goal <160     Past Surgical History:   Procedure Laterality Date     COLONOSCOPY  10/2009     NO HISTORY OF SURGERY         Social History      Tobacco Use     Smoking status: Never Smoker     Smokeless tobacco: Never Used   Substance Use Topics     Alcohol use: Never     Comment: rarely, 1 or 2 glasses a year     Family History   Problem Relation Age of Onset     Breast Cancer Mother          at age 49 1/2     Hypertension Father      Lipids Father      Hyperlipidemia Father          Current Outpatient Medications   Medication Sig Dispense Refill     Calcium-Vitamin D-Vitamin K 500-100-40 MG-UNT-MCG CHEW Take 1 tablet by mouth       Loratadine (CLARITIN PO)        Multiple Vitamin (MULTI-VITAMINS) TABS Take 1 tablet by mouth       Ibuprofen (ADVIL PO) Take by mouth as needed for moderate pain (Patient not taking: Reported on 2022)       No Known Allergies  The 10-year ASCVD risk score (Yeny BERMUDEZ Jr., et al., 2013) is: 8.8%    Values used to calculate the score:      Age: 67 years      Sex: Female      Is Non- : No      Diabetic: No      Tobacco smoker: No      Systolic Blood Pressure: 130 mmHg      Is BP treated: No      HDL Cholesterol: 64 mg/dL      Total Cholesterol: 227 mg/dL      FHS-7:   Breast CA Risk Assessment (FHS-7) 9/15/2021 2022   Did any of your first-degree relatives have breast or ovarian cancer? Yes Yes   Did any of your relatives have bilateral breast cancer? No Unknown   Did any man in your family have breast cancer? No No   Did any woman in your family have breast and ovarian cancer? No Yes   Did any woman in your family have breast cancer before age 50 y? Yes Yes   Do you have 2 or more relatives with breast and/or ovarian cancer? No No   Do you have 2 or more relatives with breast and/or bowel cancer? No No       Mammogram Screening: Recommended mammography every 1-2 years with patient discussion and risk factor consideration  Pertinent mammograms are reviewed under the imaging tab.    Review of Systems   Constitutional: Negative for chills and fever.   HENT: Negative for congestion, ear pain,  "hearing loss and sore throat.    Eyes: Negative for pain and visual disturbance.   Respiratory: Negative for cough and shortness of breath.    Cardiovascular: Negative for chest pain, palpitations and peripheral edema.   Gastrointestinal: Negative for abdominal pain, constipation, diarrhea, heartburn, hematochezia and nausea.   Breasts:  Negative for tenderness, breast mass and discharge.   Genitourinary: Negative for dysuria, frequency, genital sores, hematuria, pelvic pain, urgency, vaginal bleeding and vaginal discharge.   Musculoskeletal: Negative for arthralgias, joint swelling and myalgias.   Skin: Negative for rash.   Neurological: Negative for dizziness, weakness, headaches and paresthesias.   Psychiatric/Behavioral: Negative for mood changes. The patient is not nervous/anxious.      Constitutional, HEENT, cardiovascular, pulmonary, gi and gu systems are negative, except as otherwise noted.    OBJECTIVE:   /84 (BP Location: Right arm, Patient Position: Sitting, Cuff Size: Adult Regular)   Pulse 71   Temp 97.8  F (36.6  C) (Oral)   Ht 1.613 m (5' 3.5\")   Wt 55.1 kg (121 lb 6.4 oz)   SpO2 100%   BMI 21.17 kg/m   Estimated body mass index is 21.17 kg/m  as calculated from the following:    Height as of this encounter: 1.613 m (5' 3.5\").    Weight as of this encounter: 55.1 kg (121 lb 6.4 oz).   Wt Readings from Last 5 Encounters:   09/23/22 55.1 kg (121 lb 6.4 oz)   09/17/21 55.8 kg (123 lb)   08/11/20 56.2 kg (123 lb 12.8 oz)   08/06/19 55.9 kg (123 lb 3.2 oz)   06/08/18 55.3 kg (122 lb)       Physical Exam  GENERAL: healthy, alert and no distress  EYES: Eyes grossly normal to inspection, PERRL and conjunctivae and sclerae normal  HENT: ear canals and TM's normal, nose and mouth without ulcers or lesions  NECK: no adenopathy, no asymmetry, masses, or scars and thyroid normal to palpation  RESP: lungs clear to auscultation - no rales, rhonchi or wheezes  BREAST: normal   CV: regular rate and rhythm, " "normal S1 S2, no S3 or S4, no murmur, click or rub, no peripheral edema and peripheral pulses strong  ABDOMEN: soft, nontender, no hepatosplenomegaly, no masses and bowel sounds normal  MS: no gross musculoskeletal defects noted, no edema  SKIN: no suspicious lesions or rashes  NEURO: Normal strength and tone, mentation intact and speech normal  PSYCH: mentation appears normal, affect normal/bright    Diagnostic Test Results:  Labs reviewed in Epic    ASSESSMENT / PLAN:   (Z00.00) Encounter for subsequent annual wellness visit (AWV) in Medicare patient  (primary encounter diagnosis)  Comment: extremely healthy woman. No issues beyond routine Wellness physical exam   Plan: Basic metabolic panel  (Ca, Cl, CO2, Creat,         Gluc, K, Na, BUN), Hemoglobin, REVIEW OF HEALTH        MAINTENANCE PROTOCOL ORDERS        Follow up as indicated on results     (E78.5) Hyperlipidemia LDL goal <160  Comment: [place] risk index score reviewed from last year and will compare to this year   Plan: Lipid panel reflex to direct LDL Fasting,         REVIEW OF HEALTH MAINTENANCE PROTOCOL ORDERS              Patient has been advised of split billing requirements and indicates understanding: Yes    COUNSELING:  Reviewed preventive health counseling, as reflected in patient instructions    Estimated body mass index is 21.17 kg/m  as calculated from the following:    Height as of this encounter: 1.613 m (5' 3.5\").    Weight as of this encounter: 55.1 kg (121 lb 6.4 oz).    Weight management plan noted, stable and monitoring    She reports that she has never smoked. She has never used smokeless tobacco.      Appropriate preventive services were discussed with this patient, including applicable screening as appropriate for cardiovascular disease, diabetes, osteopenia/osteoporosis, and glaucoma.  As appropriate for age/gender, discussed screening for colorectal cancer, prostate cancer, breast cancer, and cervical cancer. Checklist reviewing " preventive services available has been given to the patient.    Reviewed patients plan of care and provided an AVS. The Basic Care Plan (routine screening as documented in Health Maintenance) for Joyce meets the Care Plan requirement. This Care Plan has been established and reviewed with the Patient.    Counseling Resources:  ATP IV Guidelines  Pooled Cohorts Equation Calculator  Breast Cancer Risk Calculator  Breast Cancer: Medication to Reduce Risk  FRAX Risk Assessment  ICSI Preventive Guidelines  Dietary Guidelines for Americans, 2010  [employer]'s MyPlate  ASA Prophylaxis  Lung CA Screening    Milton Craft MD  [facility]    Identified Health Risks:

## 2022-09-23 NOTE — LETTER
September 26, 2022    Maria Del Carmen Almaraz  [address] [facility] [address]          Dear ,    We are writing to inform you of your test results.    All of these tests are within acceptable limits. When we run the same formula with your cholesterol panel numbers, the framingham risk index score is as follows     The 10-year ASCVD risk score (Yeny BERMUDEZ Jr., et al., 2013) is: 8.6%     Values used to calculate the score:       Age: 67 years       Sex: Female       Is Non- : No       Diabetic: No       Tobacco smoker: No       Systolic Blood Pressure: 130 mmHg       Is BP treated: No       HDL Cholesterol: 69 mg/dL       Total Cholesterol: 223 mg/dL     It's the same and so treatment isn't necessary in my opinion. Please write back if you'd like to discuss this more or call or MyChart message me.       Resulted Orders   Lipid panel reflex to direct LDL Fasting   Result Value Ref Range    Cholesterol 223 (H) <200 mg/dL    Triglycerides 96 <150 mg/dL    Direct Measure HDL 69 >=50 mg/dL    LDL Cholesterol Calculated 135 (H) <=100 mg/dL    Non HDL Cholesterol 154 (H) <130 mg/dL    Patient Fasting > 8hrs? Yes     Narrative    Cholesterol  Desirable:  <200 mg/dL    Triglycerides  Normal:  Less than 150 mg/dL  Borderline High:  150-199 mg/dL  High:  200-499 mg/dL  Very High:  Greater than or equal to 500 mg/dL    Direct Measure HDL  Female:  Greater than or equal to 50 mg/dL   Male:  Greater than or equal to 40 mg/dL    LDL Cholesterol  Desirable:  <100mg/dL  Above Desirable:  100-129 mg/dL   Borderline High:  130-159 mg/dL   High:  160-189 mg/dL   Very High:  >= 190 mg/dL    Non HDL Cholesterol  Desirable:  130 mg/dL  Above Desirable:  130-159 mg/dL  Borderline High:  160-189 mg/dL  High:  190-219 mg/dL  Very High:  Greater than or equal to 220 mg/dL   Basic metabolic panel  (Ca, Cl, CO2, Creat, Gluc, K, Na, BUN)   Result Value Ref Range    Sodium 138 133 - 144 mmol/L    Potassium 4.3 3.4 - 5.3 mmol/L     Chloride 105 94 - 109 mmol/L    Carbon Dioxide (CO2) 30 20 - 32 mmol/L    Anion Gap 3 3 - 14 mmol/L    Urea Nitrogen 16 7 - 30 mg/dL    Creatinine 0.83 0.52 - 1.04 mg/dL    Calcium 9.7 8.5 - 10.1 mg/dL    Glucose 100 (H) 70 - 99 mg/dL    GFR Estimate 76 >60 mL/min/1.73m2      Comment:      Effective December 21, 2021 eGFRcr in adults is calculated using the 2021 CKD-EPI creatinine equation which includes age and gender (Froilan et al., NEJM, DOI: 10.1056/XFAExk7564434)   Hemoglobin   Result Value Ref Range    Hemoglobin 14.2 11.7 - 15.7 g/dL     If you have any questions or concerns, please call the clinic at the number listed above.       Sincerely,      Milton Craft MD

## 2022-09-23 NOTE — PATIENT INSTRUCTIONS
We discussed preventative health measures / healthcare maintenance     Question ? Pelvic exam  ? I follow the recommendations generally of the [place] health preventation task force guidelines     Look at their website address

## 2022-10-21 ENCOUNTER — IMMUNIZATION (OUTPATIENT)
Dept: FAMILY MEDICINE | Facility: CLINIC | Age: 67
End: 2022-10-21
Payer: COMMERCIAL

## 2022-10-21 DIAGNOSIS — Z23 NEED FOR PROPHYLACTIC VACCINATION AND INOCULATION AGAINST INFLUENZA: Primary | ICD-10-CM

## 2022-10-21 PROCEDURE — 99207 PR NO CHARGE NURSE ONLY: CPT

## 2022-10-21 PROCEDURE — G0008 ADMIN INFLUENZA VIRUS VAC: HCPCS

## 2022-10-21 PROCEDURE — 90662 IIV NO PRSV INCREASED AG IM: CPT

## 2022-10-21 NOTE — PROGRESS NOTES
Patient given flu vaccine without complications.    Gilma Encarnacion MA on 10/21/2022 at 10:31 AM

## 2022-10-27 ENCOUNTER — TRANSFERRED RECORDS (OUTPATIENT)
Dept: HEALTH INFORMATION MANAGEMENT | Facility: CLINIC | Age: 67
End: 2022-10-27

## 2023-09-20 ASSESSMENT — ENCOUNTER SYMPTOMS
HEMATURIA: 0
HEARTBURN: 0
WEAKNESS: 0
NAUSEA: 0
ABDOMINAL PAIN: 0
FREQUENCY: 0
DIZZINESS: 0
PARESTHESIAS: 0
JOINT SWELLING: 0
CONSTIPATION: 0
CHILLS: 0
DYSURIA: 0
HEADACHES: 0
MYALGIAS: 0
PALPITATIONS: 0
COUGH: 0
NERVOUS/ANXIOUS: 0
DIARRHEA: 0
BREAST MASS: 0
SHORTNESS OF BREATH: 0
HEMATOCHEZIA: 0
SORE THROAT: 0
FEVER: 0
EYE PAIN: 0
ARTHRALGIAS: 0

## 2023-09-20 ASSESSMENT — ACTIVITIES OF DAILY LIVING (ADL): CURRENT_FUNCTION: NO ASSISTANCE NEEDED

## 2023-09-25 ENCOUNTER — OFFICE VISIT (OUTPATIENT)
Dept: INTERNAL MEDICINE | Facility: CLINIC | Age: 68
End: 2023-09-25
Payer: COMMERCIAL

## 2023-09-25 VITALS
RESPIRATION RATE: 16 BRPM | DIASTOLIC BLOOD PRESSURE: 80 MMHG | OXYGEN SATURATION: 98 % | HEART RATE: 78 BPM | WEIGHT: 123.2 LBS | BODY MASS INDEX: 21.83 KG/M2 | HEIGHT: 63 IN | SYSTOLIC BLOOD PRESSURE: 128 MMHG

## 2023-09-25 DIAGNOSIS — Z23 NEEDS FLU SHOT: ICD-10-CM

## 2023-09-25 DIAGNOSIS — Z80.3 FAMILY HISTORY OF MALIGNANT NEOPLASM OF BREAST: ICD-10-CM

## 2023-09-25 DIAGNOSIS — Z00.00 WELLNESS EXAMINATION: Primary | ICD-10-CM

## 2023-09-25 DIAGNOSIS — Z13.6 CARDIOVASCULAR SCREENING; LDL GOAL LESS THAN 160: ICD-10-CM

## 2023-09-25 DIAGNOSIS — Z78.0 ASYMPTOMATIC MENOPAUSE: ICD-10-CM

## 2023-09-25 DIAGNOSIS — Z00.00 ENCOUNTER FOR MEDICARE ANNUAL WELLNESS EXAM: ICD-10-CM

## 2023-09-25 LAB
ANION GAP SERPL CALCULATED.3IONS-SCNC: 10 MMOL/L (ref 7–15)
BUN SERPL-MCNC: 15.1 MG/DL (ref 8–23)
CALCIUM SERPL-MCNC: 9.6 MG/DL (ref 8.8–10.2)
CHLORIDE SERPL-SCNC: 105 MMOL/L (ref 98–107)
CHOLEST SERPL-MCNC: 225 MG/DL
CREAT SERPL-MCNC: 0.88 MG/DL (ref 0.51–0.95)
DEPRECATED HCO3 PLAS-SCNC: 27 MMOL/L (ref 22–29)
EGFRCR SERPLBLD CKD-EPI 2021: 70 ML/MIN/1.73M2
GLUCOSE SERPL-MCNC: 103 MG/DL (ref 70–99)
HDLC SERPL-MCNC: 64 MG/DL
HGB BLD-MCNC: 13.9 G/DL (ref 11.7–15.7)
LDLC SERPL CALC-MCNC: 143 MG/DL
NONHDLC SERPL-MCNC: 161 MG/DL
POTASSIUM SERPL-SCNC: 5.2 MMOL/L (ref 3.4–5.3)
SODIUM SERPL-SCNC: 142 MMOL/L (ref 136–145)
TRIGL SERPL-MCNC: 90 MG/DL

## 2023-09-25 PROCEDURE — 85018 HEMOGLOBIN: CPT | Performed by: INTERNAL MEDICINE

## 2023-09-25 PROCEDURE — 36415 COLL VENOUS BLD VENIPUNCTURE: CPT | Performed by: INTERNAL MEDICINE

## 2023-09-25 PROCEDURE — 80061 LIPID PANEL: CPT | Performed by: INTERNAL MEDICINE

## 2023-09-25 PROCEDURE — 80048 BASIC METABOLIC PNL TOTAL CA: CPT | Performed by: INTERNAL MEDICINE

## 2023-09-25 PROCEDURE — G0439 PPPS, SUBSEQ VISIT: HCPCS | Performed by: INTERNAL MEDICINE

## 2023-09-25 ASSESSMENT — ACTIVITIES OF DAILY LIVING (ADL): CURRENT_FUNCTION: NO ASSISTANCE NEEDED

## 2023-09-25 ASSESSMENT — ENCOUNTER SYMPTOMS
CHILLS: 0
SORE THROAT: 0
HEMATURIA: 0
BREAST MASS: 0
HEADACHES: 0
FREQUENCY: 0
SHORTNESS OF BREATH: 0
FEVER: 0
JOINT SWELLING: 0
COUGH: 0
CONSTIPATION: 0
ARTHRALGIAS: 0
DYSURIA: 0
MYALGIAS: 0
NAUSEA: 0
HEMATOCHEZIA: 0
EYE PAIN: 0
ABDOMINAL PAIN: 0
DIARRHEA: 0
WEAKNESS: 0
DIZZINESS: 0
PARESTHESIAS: 0
HEARTBURN: 0
NERVOUS/ANXIOUS: 0
PALPITATIONS: 0

## 2023-09-25 ASSESSMENT — PAIN SCALES - GENERAL: PAINLEVEL: NO PAIN (0)

## 2023-09-25 NOTE — LETTER
September 26, 2023    Joyce T Rufina  [address] [facility] [address]          Dear ,    We are writing to inform you of your test results.  All of these tests are within acceptable limits , things look good !       Resulted Orders   Hemoglobin   Result Value Ref Range    Hemoglobin 13.9 11.7 - 15.7 g/dL   Basic metabolic panel  (Ca, Cl, CO2, Creat, Gluc, K, Na, BUN)   Result Value Ref Range    Sodium 142 136 - 145 mmol/L    Potassium 5.2 3.4 - 5.3 mmol/L    Chloride 105 98 - 107 mmol/L    Carbon Dioxide (CO2) 27 22 - 29 mmol/L    Anion Gap 10 7 - 15 mmol/L    Urea Nitrogen 15.1 8.0 - 23.0 mg/dL    Creatinine 0.88 0.51 - 0.95 mg/dL    GFR Estimate 70 >60 mL/min/1.73m2    Calcium 9.6 8.8 - 10.2 mg/dL    Glucose 103 (H) 70 - 99 mg/dL   Lipid panel reflex to direct LDL Fasting   Result Value Ref Range    Cholesterol 225 (H) <200 mg/dL    Triglycerides 90 <150 mg/dL    Direct Measure HDL 64 >=50 mg/dL    LDL Cholesterol Calculated 143 (H) <=100 mg/dL    Non HDL Cholesterol 161 (H) <130 mg/dL    Narrative    Cholesterol  Desirable:  <200 mg/dL    Triglycerides  Normal:  Less than 150 mg/dL  Borderline High:  150-199 mg/dL  High:  200-499 mg/dL  Very High:  Greater than or equal to 500 mg/dL    Direct Measure HDL  Female:  Greater than or equal to 50 mg/dL   Male:  Greater than or equal to 40 mg/dL    LDL Cholesterol  Desirable:  <100mg/dL  Above Desirable:  100-129 mg/dL   Borderline High:  130-159 mg/dL   High:  160-189 mg/dL   Very High:  >= 190 mg/dL    Non HDL Cholesterol  Desirable:  130 mg/dL  Above Desirable:  130-159 mg/dL  Borderline High:  160-189 mg/dL  High:  190-219 mg/dL  Very High:  Greater than or equal to 220 mg/dL       If you have any questions or concerns, please call the clinic at the number listed above.       Sincerely,      Milton Craft MD

## 2023-09-25 NOTE — PATIENT INSTRUCTIONS
Patient Education   Personalized Prevention Plan  You are due for the preventive services outlined below.  Your care team is available to assist you in scheduling these services.  If you have already completed any of these items, please share that information with your care team to update in your medical record.  Health Maintenance Due   Topic Date Due     Flu Vaccine (1) 09/01/2023     Annual Wellness Visit  09/23/2023

## 2023-09-25 NOTE — PROGRESS NOTES
"SUBJECTIVE:   Maria Del Carmen is a 68 year old who presents for Preventive Visit.      9/25/2023     9:00 AM   Additional Questions   Roomed by Claudia MITCHELL   Accompanied by self     Are you in the first 12 months of your Medicare coverage?  No    Healthy Habits:     In general, how would you rate your overall health?  Excellent    Frequency of exercise:  4-5 days/week    Duration of exercise:  45-60 minutes    Do you usually eat at least 4 servings of fruit and vegetables a day, include whole grains    & fiber and avoid regularly eating high fat or \"junk\" foods?  Yes    Taking medications regularly:  Yes    Medication side effects:  Not applicable    Ability to successfully perform activities of daily living:  No assistance needed    Home Safety:  No safety concerns identified    Hearing Impairment:  No hearing concerns    In the past 6 months, have you been bothered by leaking of urine?  No    In general, how would you rate your overall mental or emotional health?  Excellent    Additional concerns today:  No    Today's PHQ-2 Score:       9/25/2023     8:58 AM   PHQ-2 ( 1999 [employer])   Q1: Little interest or pleasure in doing things 0   Q2: Feeling down, depressed or hopeless 0   PHQ-2 Score 0   Q1: Little interest or pleasure in doing things Not at all   Q2: Feeling down, depressed or hopeless Not at all   PHQ-2 Score 0     Have you ever done Advance Care Planning? (For example, a Health Directive, POLST, or a discussion with a medical provider or your loved ones about your wishes): No, advance care planning information given to patient to review.  Advanced care planning was discussed at today's visit.     Fall risk  Fallen 2 or more times in the past year?: No  Any fall with injury in the past year?: No    Cognitive Screening   1) Repeat 3 items (Leader, Season, Table)    2) Clock draw: NORMAL  3) 3 item recall: Recalls 3 objects  Results: 3 items recalled: COGNITIVE IMPAIRMENT LESS LIKELY    Mini-CogTM Copyright S [employer]. " Licensed by the author for use in [facility]; reprinted with permission (david@[facility]). All rights reserved.      Do you have sleep apnea, excessive snoring or daytime drowsiness? : no    Reviewed and updated as needed this visit by clinical staff   Tobacco  Allergies  Meds              Reviewed and updated as needed this visit by Provider                 Social History     Tobacco Use     Smoking status: Never     Smokeless tobacco: Never   Substance Use Topics     Alcohol use: Never     Comment: rarely, 1 or 2 glasses a year             9/20/2023     6:41 PM   Alcohol Use   Prescreen: >3 drinks/day or >7 drinks/week? Not Applicable          No data to display              Do you have a current opioid prescription? No  Do you use any other controlled substances or medications that are not prescribed by a provider? None              Current providers sharing in care for this patient include:   Patient Care Team:  Milton Craft MD as PCP - General (Internal Medicine)  Milton Craft MD as Assigned PCP    The following health maintenance items are reviewed in Epic and correct as of today:  Health Maintenance   Topic Date Due     INFLUENZA VACCINE (1) 09/01/2023     ANNUAL REVIEW OF HM ORDERS  09/23/2023     MEDICARE ANNUAL WELLNESS VISIT  09/23/2023     FALL RISK ASSESSMENT  09/25/2024     MAMMO SCREENING  12/05/2024     LIPID  09/23/2027     ADVANCE CARE PLANNING  09/23/2027     DTAP/TDAP/TD IMMUNIZATION (3 - Td or Tdap) 08/06/2029     COLORECTAL CANCER SCREENING  10/27/2032     DEXA  10/31/2033     HEPATITIS C SCREENING  Completed     PHQ-2 (once per calendar year)  Completed     Pneumococcal Vaccine: 65+ Years  Completed     ZOSTER IMMUNIZATION  Completed     COVID-19 Vaccine  Completed     IPV IMMUNIZATION  Aged Out     HPV IMMUNIZATION  Aged Out     MENINGITIS IMMUNIZATION  Aged Out     Health Maintenance Due   Topic Date Due     INFLUENZA VACCINE (1) 09/01/2023     ANNUAL REVIEW OF HM ORDERS   "09/23/2023     MEDICARE ANNUAL WELLNESS VISIT  09/23/2023         Lab work is in process        Review of Systems   Constitutional:  Negative for chills and fever.   HENT:  Negative for congestion, ear pain, hearing loss and sore throat.    Eyes:  Negative for pain and visual disturbance.   Respiratory:  Negative for cough and shortness of breath.    Cardiovascular:  Negative for chest pain, palpitations and peripheral edema.   Gastrointestinal:  Negative for abdominal pain, constipation, diarrhea, heartburn, hematochezia and nausea.   Breasts:  Negative for tenderness, breast mass and discharge.   Genitourinary:  Negative for dysuria, frequency, genital sores, hematuria, pelvic pain, urgency, vaginal bleeding and vaginal discharge.   Musculoskeletal:  Negative for arthralgias, joint swelling and myalgias.   Skin:  Negative for rash.   Neurological:  Negative for dizziness, weakness, headaches and paresthesias.   Psychiatric/Behavioral:  Negative for mood changes. The patient is not nervous/anxious.      Constitutional, HEENT, cardiovascular, pulmonary, gi and gu systems are negative, except as otherwise noted.    OBJECTIVE:   /80   Pulse 78   Resp 16   Ht 1.61 m (5' 3.39\")   Wt 55.9 kg (123 lb 3.2 oz)   SpO2 98%   BMI 21.56 kg/m   Estimated body mass index is 21.56 kg/m  as calculated from the following:    Height as of this encounter: 1.61 m (5' 3.39\").    Weight as of this encounter: 55.9 kg (123 lb 3.2 oz).  Physical Exam  GENERAL: healthy, alert and no distress  EYES: Eyes grossly normal to inspection, PERRL and conjunctivae and sclerae normal  HENT: ear canals and TM's normal, nose and mouth without ulcers or lesions  NECK: no adenopathy, no asymmetry, masses, or scars and thyroid normal to palpation  RESP: lungs clear to auscultation - no rales, rhonchi or wheezes  BREAST: normal   CV: regular rate and rhythm, normal S1 S2, no S3 or S4, no murmur, click or rub, no peripheral edema and peripheral " pulses strong  ABDOMEN: soft, nontender, no hepatosplenomegaly, no masses and bowel sounds normal  MS: no gross musculoskeletal defects noted, no edema  SKIN: no suspicious lesions or rashes  NEURO: Normal strength and tone, mentation intact and speech normal  PSYCH: mentation appears normal, affect normal/bright    Diagnostic Test Results:  Labs reviewed in Epic  Orders Placed This Encounter   Procedures     REVIEW OF HEALTH MAINTENANCE PROTOCOL ORDERS     DX Hip/Pelvis/Spine     Hemoglobin     Basic metabolic panel  (Ca, Cl, CO2, Creat, Gluc, K, Na, BUN)     Lipid panel reflex to direct LDL Fasting         ASSESSMENT / PLAN:   (Z00.00) Wellness examination  (primary encounter diagnosis)  Comment: routine screening issues   Plan: REVIEW OF HEALTH MAINTENANCE PROTOCOL ORDERS,         Hemoglobin, Basic metabolic panel  (Ca, Cl,         CO2, Creat, Gluc, K, Na, BUN)        See orders section of this encounter     (Z78.0) Asymptomatic menopause  Comment: we reviewed her schedule with DEXA scan and because with her right hip she is at a -2.4 T score, a sooner recheck DEXA scan in 3 instead of 5 years is most appropriate in my opinion    Plan: DX Hip/Pelvis/Spine        Follow up as indicated on results     (Z23) Needs flu shot  Comment: to be administered   Plan:     (Z13.6) CARDIOVASCULAR SCREENING; LDL GOAL LESS THAN 160  Comment: routine screening , follow up as indicated on results   Plan: Lipid panel reflex to direct LDL Fasting            (Z80.3) Family history of malignant neoplasm of breast  Comment: due to this history she will continue with annual mammograms  Plan: REVIEW OF HEALTH MAINTENANCE PROTOCOL ORDERS            Patient has been advised of split billing requirements and indicates understanding: Yes      COUNSELING:  Reviewed preventive health counseling, as reflected in patient instructions        She reports that she has never smoked. She has never used smokeless tobacco.      Appropriate preventive  services were discussed with this patient, including applicable screening as appropriate for cardiovascular disease, diabetes, osteopenia/osteoporosis, and glaucoma.  As appropriate for age/gender, discussed screening for colorectal cancer, prostate cancer, breast cancer, and cervical cancer. Checklist reviewing preventive services available has been given to the patient.    Reviewed patients plan of care and provided an AVS. The Basic Care Plan (routine screening as documented in Health Maintenance) for Joyce meets the Care Plan requirement. This Care Plan has been established and reviewed with the Patient.          Milton Craft MD  [facility]    Identified Health Risks:  I have reviewed Opioid Use Disorder and Substance Use Disorder risk factors and made any needed referrals.

## 2023-09-27 ENCOUNTER — ANCILLARY PROCEDURE (OUTPATIENT)
Dept: BONE DENSITY | Facility: CLINIC | Age: 68
End: 2023-09-27
Attending: INTERNAL MEDICINE
Payer: COMMERCIAL

## 2023-09-27 DIAGNOSIS — Z78.0 ASYMPTOMATIC MENOPAUSE: ICD-10-CM

## 2023-09-27 PROCEDURE — 77080 DXA BONE DENSITY AXIAL: CPT | Performed by: INTERNAL MEDICINE

## 2024-05-16 ENCOUNTER — HOSPITAL ENCOUNTER (OUTPATIENT)
Dept: GENERAL RADIOLOGY | Facility: HOSPITAL | Age: 69
Discharge: HOME | End: 2024-05-16
Attending: NURSE PRACTITIONER | Admitting: NURSE PRACTITIONER
Payer: COMMERCIAL

## 2024-05-16 ENCOUNTER — OFFICE VISIT (OUTPATIENT)
Dept: PHYSICAL MEDICINE AND REHAB | Facility: CLINIC | Age: 69
End: 2024-05-16
Payer: COMMERCIAL

## 2024-05-16 VITALS
DIASTOLIC BLOOD PRESSURE: 77 MMHG | WEIGHT: 125 LBS | BODY MASS INDEX: 22.15 KG/M2 | HEART RATE: 88 BPM | SYSTOLIC BLOOD PRESSURE: 154 MMHG | HEIGHT: 63 IN

## 2024-05-16 DIAGNOSIS — M46.1 SACROILIITIS (H): Primary | ICD-10-CM

## 2024-05-16 DIAGNOSIS — M46.1 SACROILIITIS (H): ICD-10-CM

## 2024-05-16 PROCEDURE — 72202 X-RAY EXAM SI JOINTS 3/> VWS: CPT

## 2024-05-16 PROCEDURE — 99204 OFFICE O/P NEW MOD 45 MIN: CPT | Performed by: NURSE PRACTITIONER

## 2024-05-16 RX ORDER — IBUPROFEN 800 MG/1
800 TABLET, FILM COATED ORAL EVERY 8 HOURS PRN
Qty: 45 TABLET | Refills: 0 | Status: SHIPPED | OUTPATIENT
Start: 2024-05-16 | End: 2024-10-03

## 2024-05-16 ASSESSMENT — PAIN SCALES - GENERAL: PAINLEVEL: NO PAIN (1)

## 2024-05-16 NOTE — LETTER
5/16/2024         RE: Maria Del Carmen Almaraz  [address]  [facility] [address]        Dear Colleague,    Thank you for referring your patient, Maria Del Carmen Almaraz, to the [facility]. Please see a copy of my visit note below.    ASSESSMENT: Maria Del Carmen Almaraz is a 68 year old female who presents for consultation at the request of PCP Milton Craft, who presents today for new patient evaluation of:    -left sacroiliitis    Patient is neurologically intact on exam. No myelopathic or red flag symptoms. Pain thought to be related to sacroiliitis based on multiple positive left SI maneuvers today. We talked about the possibility it could be related to lumbar radiculopathy as well, given symptoms can be similar however negative straight leg raise. Would recommend continued anti-inflammatories as needed and starting a course of PT. Ordered ibuprofen 800mg. I recommended sacroiliac joint XR today. She will follow up with me after full course of PT If still having symptoms. Briefly discussed role of left SI injection if pain worsens or does not improve with PT.           5/15/2024     2:25 PM   OSWESTRY DISABILITY INDEX   Count 10   Sum 9   Oswestry Score (%) 18 %            Diagnoses and all orders for this visit:  Sacroiliitis (H24)  -     ibuprofen (ADVIL/MOTRIN) 800 MG tablet; Take 1 tablet (800 mg) by mouth every 8 hours as needed for moderate pain  -     XR Sacroiliac Joint G/E 3 Views; Future  -     Physical Therapy  Referral; Future      PLAN:  Reviewed spine anatomy and disease process. Discussed diagnosis and treatment options with the patient today. A shared decision making model was used.  The patient's values and choices were respected. The following represents what was discussed and decided upon by the provider and the patient.      -DIAGNOSTIC TESTS:  Images were personally reviewed and interpreted and explained to patient today using a spine model.   --sacroiliac joint xr    -PHYSICAL THERAPY:     --Referral to PT placed  Discussed the importance of core strengthening, ROM, stretching exercises with the patient and how each of these entities is important in decreasing pain.  Explained to the patient that the purpose of physical therapy is to teach the patient a home exercise program.  These exercises need to be performed every day in order to decrease pain and prevent future occurrences of pain.        -MEDICATIONS:    --start ibuprofen 800mg TID PRN  Discussed multiple medication options today with patient. Discussed risks, side effects, and proper use of medications. Patient verbalized understanding.    -INTERVENTIONS:    -Discussed the role of sacroiliac joint injections with patient today. Patient would be a good candidate in the future for either SI injections, epidural steroid injections or medial branch blocks if indicated based on symptoms and supported by imaging results.    -PATIENT EDUCATION:  Total time of 40 minutes, on the day of service, spent with the patient, reviewing the chart, placing orders, and documenting.   -Today we also discussed the pros and cons of the current treatment plan.    -FOLLOW-UP:   we will call with imaging results    Advised patient to call the Spine Center if symptoms worsen, new numbness or weakness develops in the legs, or if they develop new or worsening problems controlling bladder or bowel function.   ______________________________________________________________________    SUBJECTIVE:    HPI:  Maria Del Carmen Almaraz  Is a 68 year old female hx osteopenia and hyperlipidemia who presents today for new patient evaluation of left sciatica    Maria Del Carmen's symptoms started several months ago without injury. She has left gluteal pain into posterolateral thigh and prox lower leg. She had started running again in March and recalls the onset of some mild gluteal pain around that time. It had continued to progress, never really went away, and then beginning of April she returned to Yoga  classes (since before the pandemic). Forward bending felt fine. Then in the 3rd class with more back bending, she felt more pain. She had resumed some chiropractic care toward the end of April, going 2x per week for about 4 wks. Pain has continued despite this. Some things feel better, but other things bother it, gets better and then worse. No pattern. Sometimes straightening the left leg causes more pain, but today it does not. Pain in the gluteal region particularly worse with lying down and reports a discomfort/fuzziness feeling into the left leg. She has some fuzziness in the left leg, which comes and goes. No numbness. Maybe a little tingling. Pain level 3/10 today, 7/10 at worst, 1/10 at best.    It is worse when she tries to sleep. Most of the time during the day. She has cut back on her activity to see if this helps. She has been taking 2 tabs ibuprofen twice daily for some weeks. She then tried to stop it for a week to see if she noticed a difference. She does not think she noticed a huge difference, then started taking it again just in case it was helping.    Walking 4 mi 4-5x per week with yoga classes pre-covid.  Now walking less distance, but still frequent.    She has not done PT  No hx previous injections or spine surgeries  She has been getting monthly massages  No changes in bowel or blader control, no leg wekaness.     -Treatment to Date:     -Medications:  ibuprofen    Current Outpatient Medications   Medication Sig Dispense Refill     ibuprofen (ADVIL/MOTRIN) 800 MG tablet Take 1 tablet (800 mg) by mouth every 8 hours as needed for moderate pain 45 tablet 0     Calcium-Vitamin D-Vitamin K 500-100-40 MG-UNT-MCG CHEW Take 1 tablet by mouth       Ibuprofen (ADVIL PO) Take by mouth as needed for moderate pain (Patient not taking: Reported on 9/23/2022)       Loratadine (CLARITIN PO)        Multiple Vitamin (MULTI-VITAMINS) TABS Take 1 tablet by mouth       No current facility-administered medications  "for this visit.       No Known Allergies    No past medical history on file.     Patient Active Problem List   Diagnosis     Hyperlipidemia LDL goal <160       Past Surgical History:   Procedure Laterality Date     COLONOSCOPY  10/2009     NO HISTORY OF SURGERY         Family History   Problem Relation Age of Onset     Breast Cancer Mother          at age 49 1/2     Hypertension Father      Lipids Father      Hyperlipidemia Father        Reviewed past medical, surgical, and family history with patient found on new patient intake packet located in EMR Media tab.     SOCIAL HX: nonsmoker, no alcohol use, no heavy drinking, no rec drug use    ROS: positive for joint pain, muscle pain, sciatica, insomnia. Specifically negative for bowel/bladder dysfunction, balance changes, headache, dizziness, foot drop, fevers, chills, appetite changes, nausea/vomiting, unexplained weight loss. Otherwise 13 systems reviewed are negative. Please see the patient's intake questionnaire from today for details.    OBJECTIVE:  BP (!) 154/77   Pulse 88   Ht 5' 3.39\" (1.61 m)   Wt 125 lb (56.7 kg)   BMI 21.87 kg/m      PHYSICAL EXAMINATION:    --CONSTITUTIONAL:  Vital signs as above.  No acute distress.  The patient is well nourished and well groomed.  --PSYCHIATRIC:  Appropriate mood and affect. The patient is awake, alert, oriented to person, place, time and answering questions appropriately with clear speech.    --SKIN:  Skin over the face, bilateral lower extremities, and posterior torso is clean, dry, intact without rashes.    --RESPIRATORY: Normal rhythm and effort. No abnormal accessory muscle breathing patterns noted.   --ABDOMINAL:  Non-distended.    --GROSS MOTOR: Gait is non-antalgic. Easily arises from a seated position. Toe walking and heel walking are normal without significant difficulty.      --LOWER EXTREMITY MOTOR TESTING:  Hip flexion: right 5/5, left 5/5  Hip abduction: right 5/5, left 5/5  Hip adduction: right " 5/5, left 5/5   Quads: right 5/5, left 5/5  Hamstrings: right 5/5, left 5/5  Dorsiflexion: right 5/5, left 5/5  Plantar flexion: right 5/5, left 5/5    Great toe MTP extension/EHL: right 5/5, left 5/5    --NEUROLOGICAL:  2/4 patellar and achilles reflexes bilaterally. Sensation to light touch is intact throughout both lower extremities. Babinski is negative. No clonus.    --MUSCULOSKELETAL: Lumbar spine inspection reveals no evidence of deformity. Range of motion is not limited in lumbar flexion, sidebending, lateral rotation. She does however have pain with R lateral rotation, left sidebending, and flexion. Extension is reduced moderately due to pain. No point tenderness to palpation lumbar spine. No paraspinal musculature tenderness.     Straight leg raising is negative.    --HIPS: Full range of motion bilaterally. Negative ADINA and Negative FADIR bilaterally. Negative hip joint tenderness to palp.    --SACROILIAC JOINT: Distraction maneuver was positive on left. Gaenslen's Test was positive on left. Thigh thrust was negative. Sacroiliac Joint Compression Test was positive on left. One finger point test was positive on left. Positive left SI joint tenderness to palpation, not on right    --VASCULAR:  Bilateral lower extremities are warm without any discoloration.  There is no pitting edema of the bilateral lower extremities.    RESULTS:   Prior medical records from [facility] and Care Everywhere were reviewed today.    Labs:  Sept 2023 creatinine 0.88    Imaging:      No results found.      This note was dictated using voice recognition software. Any grammatical or context distortions are unintentional and inherent to the software.       Janelle Mackey FNP-C  [facility]  O. [phone]             Again, thank you for allowing me to participate in the care of your patient.        Sincerely,        Janelle Mackey, AUNG CNP

## 2024-05-16 NOTE — PROGRESS NOTES
ASSESSMENT: Maria Del Carmen Almaraz is a 68 year old female who presents for consultation at the request of PCP Milton Craft, who presents today for new patient evaluation of:    -left sacroiliitis    Patient is neurologically intact on exam. No myelopathic or red flag symptoms. Pain thought to be related to sacroiliitis based on multiple positive left SI maneuvers today. We talked about the possibility it could be related to lumbar radiculopathy as well, given symptoms can be similar however negative straight leg raise. Would recommend continued anti-inflammatories as needed and starting a course of PT. Ordered ibuprofen 800mg. I recommended sacroiliac joint XR today. She will follow up with me after full course of PT If still having symptoms. Briefly discussed role of left SI injection if pain worsens or does not improve with PT.           5/15/2024     2:25 PM   OSWESTRY DISABILITY INDEX   Count 10   Sum 9   Oswestry Score (%) 18 %            Diagnoses and all orders for this visit:  Sacroiliitis (H24)  -     ibuprofen (ADVIL/MOTRIN) 800 MG tablet; Take 1 tablet (800 mg) by mouth every 8 hours as needed for moderate pain  -     XR Sacroiliac Joint G/E 3 Views; Future  -     Physical Therapy  Referral; Future      PLAN:  Reviewed spine anatomy and disease process. Discussed diagnosis and treatment options with the patient today. A shared decision making model was used.  The patient's values and choices were respected. The following represents what was discussed and decided upon by the provider and the patient.      -DIAGNOSTIC TESTS:  Images were personally reviewed and interpreted and explained to patient today using a spine model.   --sacroiliac joint xr    -PHYSICAL THERAPY:    --Referral to PT placed  Discussed the importance of core strengthening, ROM, stretching exercises with the patient and how each of these entities is important in decreasing pain.  Explained to the patient that the purpose of physical  therapy is to teach the patient a home exercise program.  These exercises need to be performed every day in order to decrease pain and prevent future occurrences of pain.        -MEDICATIONS:    --start ibuprofen 800mg TID PRN  Discussed multiple medication options today with patient. Discussed risks, side effects, and proper use of medications. Patient verbalized understanding.    -INTERVENTIONS:    -Discussed the role of sacroiliac joint injections with patient today. Patient would be a good candidate in the future for either SI injections, epidural steroid injections or medial branch blocks if indicated based on symptoms and supported by imaging results.    -PATIENT EDUCATION:  Total time of 40 minutes, on the day of service, spent with the patient, reviewing the chart, placing orders, and documenting.   -Today we also discussed the pros and cons of the current treatment plan.    -FOLLOW-UP:   we will call with imaging results    Advised patient to call the [facility] if symptoms worsen, new numbness or weakness develops in the legs, or if they develop new or worsening problems controlling bladder or bowel function.   ______________________________________________________________________    SUBJECTIVE:    HPI:  Maria Del Carmen Almaraz  Is a 68 year old female hx osteopenia and hyperlipidemia who presents today for new patient evaluation of left sciatica    Maria Del Carmen's symptoms started several months ago without injury. She has left gluteal pain into posterolateral thigh and prox lower leg. She had started running again in March and recalls the onset of some mild gluteal pain around that time. It had continued to progress, never really went away, and then beginning of April she returned to Yoga classes (since before the pandemic). Forward bending felt fine. Then in the 3rd class with more back bending, she felt more pain. She had resumed some chiropractic care toward the end of April, going 2x per week for about 4 wks. Pain has  continued despite this. Some things feel better, but other things bother it, gets better and then worse. No pattern. Sometimes straightening the left leg causes more pain, but today it does not. Pain in the gluteal region particularly worse with lying down and reports a discomfort/fuzziness feeling into the left leg. She has some fuzziness in the left leg, which comes and goes. No numbness. Maybe a little tingling. Pain level 3/10 today, 7/10 at worst, 1/10 at best.    It is worse when she tries to sleep. Most of the time during the day. She has cut back on her activity to see if this helps. She has been taking 2 tabs ibuprofen twice daily for some weeks. She then tried to stop it for a week to see if she noticed a difference. She does not think she noticed a huge difference, then started taking it again just in case it was helping.    Walking 4 mi 4-5x per week with yoga classes pre-covid.  Now walking less distance, but still frequent.    She has not done PT  No hx previous injections or spine surgeries  She has been getting monthly massages  No changes in bowel or blader control, no leg wekaness.     -Treatment to Date:     -Medications:  ibuprofen    Current Outpatient Medications   Medication Sig Dispense Refill    ibuprofen (ADVIL/MOTRIN) 800 MG tablet Take 1 tablet (800 mg) by mouth every 8 hours as needed for moderate pain 45 tablet 0    Calcium-Vitamin D-Vitamin K 500-100-40 MG-UNT-MCG CHEW Take 1 tablet by mouth      Ibuprofen (ADVIL PO) Take by mouth as needed for moderate pain (Patient not taking: Reported on 9/23/2022)      Loratadine (CLARITIN PO)       Multiple Vitamin (MULTI-VITAMINS) TABS Take 1 tablet by mouth       No current facility-administered medications for this visit.       No Known Allergies    No past medical history on file.     Patient Active Problem List   Diagnosis    Hyperlipidemia LDL goal <160       Past Surgical History:   Procedure Laterality Date    COLONOSCOPY  10/2009    NO  "HISTORY OF SURGERY         Family History   Problem Relation Age of Onset    Breast Cancer Mother          at age 49 1/2    Hypertension Father     Lipids Father     Hyperlipidemia Father        Reviewed past medical, surgical, and family history with patient found on new patient intake packet located in EMR Media tab.     SOCIAL HX: nonsmoker, no alcohol use, no heavy drinking, no rec drug use    ROS: positive for joint pain, muscle pain, sciatica, insomnia. Specifically negative for bowel/bladder dysfunction, balance changes, headache, dizziness, foot drop, fevers, chills, appetite changes, nausea/vomiting, unexplained weight loss. Otherwise 13 systems reviewed are negative. Please see the patient's intake questionnaire from today for details.    OBJECTIVE:  BP (!) 154/77   Pulse 88   Ht 5' 3.39\" (1.61 m)   Wt 125 lb (56.7 kg)   BMI 21.87 kg/m      PHYSICAL EXAMINATION:    --CONSTITUTIONAL:  Vital signs as above.  No acute distress.  The patient is well nourished and well groomed.  --PSYCHIATRIC:  Appropriate mood and affect. The patient is awake, alert, oriented to person, place, time and answering questions appropriately with clear speech.    --SKIN:  Skin over the face, bilateral lower extremities, and posterior torso is clean, dry, intact without rashes.    --RESPIRATORY: Normal rhythm and effort. No abnormal accessory muscle breathing patterns noted.   --ABDOMINAL:  Non-distended.    --GROSS MOTOR: Gait is non-antalgic. Easily arises from a seated position. Toe walking and heel walking are normal without significant difficulty.      --LOWER EXTREMITY MOTOR TESTING:  Hip flexion: right 5/5, left 5/5  Hip abduction: right 5/5, left 5/5  Hip adduction: right 5/5, left 5/5   Quads: right 5/5, left 5/5  Hamstrings: right 5/5, left 5/5  Dorsiflexion: right 5/5, left 5/5  Plantar flexion: right 5/5, left 5/5    Great toe MTP extension/EHL: right 5/5, left 5/5    --NEUROLOGICAL:  2/4 patellar and achilles " reflexes bilaterally. Sensation to light touch is intact throughout both lower extremities. Babinski is negative. No clonus.    --MUSCULOSKELETAL: Lumbar spine inspection reveals no evidence of deformity. Range of motion is not limited in lumbar flexion, sidebending, lateral rotation. She does however have pain with R lateral rotation, left sidebending, and flexion. Extension is reduced moderately due to pain. No point tenderness to palpation lumbar spine. No paraspinal musculature tenderness.     Straight leg raising is negative.    --HIPS: Full range of motion bilaterally. Negative ADINA and Negative FADIR bilaterally. Negative hip joint tenderness to palp.    --SACROILIAC JOINT: Distraction maneuver was positive on left. Gaenslen's Test was positive on left. Thigh thrust was negative. Sacroiliac Joint Compression Test was positive on left. One finger point test was positive on left. Positive left SI joint tenderness to palpation, not on right    --VASCULAR:  Bilateral lower extremities are warm without any discoloration.  There is no pitting edema of the bilateral lower extremities.    RESULTS:   Prior medical records from [facility] were reviewed today.    Labs:  Sept 2023 creatinine 0.88    Imaging:      No results found.      This note was dictated using voice recognition software. Any grammatical or context distortions are unintentional and inherent to the software.       Janelle [facility]  [facility]  O. [phone]

## 2024-05-16 NOTE — PATIENT INSTRUCTIONS
Prescribed ibuprofen 800mg today. Please take as prescribed, as needed for pain control as well as to aid in decreasing inflammation.   Take medication with a full glass of water and with food.   *Do not take Advil, Ibuprofen, Aleve, or Naproxen while taking this medication as it can cause organ failure if taken together*  This medication does have risks if taken long-term, these risks include: gastrointestinal irritation, kidney dysfunction, and cardiovascular effects.  We will check your kidney function as needed while you're on this medication.  Stop taking this medication if you have intolerable side effects or blood in the stool.     Imaging (Xray) has been ordered today.   Radiology will call you to schedule. Please call below if you do not hear from them in the next couple of days.     [facility] Radiology Scheduling:  Please call [phone] to schedule your image(s) (select option #1).    There are 3 different locations:    [address] [facility] [place]  [address]   [place] [address]     Importance of specialized Physical Therapy:     Today, we discussed the importance of core strengthening, ROM, and stretching exercises, and how each of these are key in decreasing pain.   The purpose of physical therapy is to teach patients a home exercise program individualized to them and their specific health concerns.  These exercises need to be performed every day in order to decrease pain and prevent future occurrences of pain.          ~Please call our [facility] Nurse Navigation line [phone] with any questions or concerns about your treatment plan, if symptoms worsen and you would like to be seen urgently, or if you have any new or worsening numbness, weakness, or problems controlling bladder and bowel function.  ~You are also  welcome to contact Janelle Mackey via [employer], but please be aware that responses to [employer]Star message may take 2-3 days due to the high volume of patients seen in clinic.

## 2024-05-17 ENCOUNTER — TELEPHONE (OUTPATIENT)
Dept: PHYSICAL MEDICINE AND REHAB | Facility: CLINIC | Age: 69
End: 2024-05-17
Payer: COMMERCIAL

## 2024-05-17 NOTE — TELEPHONE ENCOUNTER
Phone call to patient to review results and provider's recommendations. Attempted the call twice. Phone rang both times; sounded like picked up but no sound or able to leave message.

## 2024-05-17 NOTE — TELEPHONE ENCOUNTER
----- Message from AUNG Barfield CNP sent at 5/17/2024  2:41 PM CDT -----  Please let Maria Del Carmen know that her xrays look good. She has some degenerative changes of both hip joints. No fractures or other concerns. She should contact us if things do not improve/resolve with PT

## 2024-05-20 NOTE — TELEPHONE ENCOUNTER
Pt returned call. Results and recommendations given. Pt stated understanding. Pt is scheduled to start PT on 6/5.

## 2024-06-05 ENCOUNTER — THERAPY VISIT (OUTPATIENT)
Dept: PHYSICAL THERAPY | Facility: REHABILITATION | Age: 69
End: 2024-06-05
Attending: NURSE PRACTITIONER
Payer: COMMERCIAL

## 2024-06-05 DIAGNOSIS — M46.1 SACROILIITIS (H): ICD-10-CM

## 2024-06-05 PROCEDURE — 97161 PT EVAL LOW COMPLEX 20 MIN: CPT | Mod: GP | Performed by: PHYSICAL THERAPIST

## 2024-06-05 PROCEDURE — 97110 THERAPEUTIC EXERCISES: CPT | Mod: GP | Performed by: PHYSICAL THERAPIST

## 2024-06-05 NOTE — PROGRESS NOTES
PHYSICAL THERAPY EVALUATION  Type of Visit: Evaluation    See electronic medical record for Abuse and Falls Screening details.    Subjective       Presenting condition or subjective complaint: Pain in my butt and pain on the outside of my calf. Walking is difficult. Cannot get restful sleep because of pain.  Date of onset: 03/15/24    Relevant medical history: Menopause; Pain at night or rest   Dates & types of surgery: none    Prior diagnostic imaging/testing results: X-ray     Prior therapy history for the same diagnosis, illness or injury: No      Prior Level of Function  Transfers: Independent  Ambulation: Independent  ADL: Independent      Living Environment  Social support: With a significant other or spouse   Type of home: House; Multi-level   Stairs to enter the home: Yes 5 Is there a railing: Yes   Ramp: No   Stairs inside the home: Yes 14 Is there a railing: Yes   Help at home: None  Equipment owned:       Employment: No    Hobbies/Interests: being outdoors - walking/running; yoga classes; [place] puzzles    Patient goals for therapy: Get restful sleep and resume normal activity of walking 3-4 miles 3 to 4 times a week and yoga classes.    Pain assessment: See objective evaluation for additional pain details   Tingling and pain down back of leg left to calf.  Objective   LUMBAR SPINE EVALUATION  PAIN: Pain Level at Rest: 1/10  Pain Level with Use: 8/10  Pain Location: lumbar spine  Pain is Exacerbated By: lying on back, supine to sit, mornings   Pain is Relieved By: NSAIDs and stretch    POSTURE:  flexed forward at waist with slight trunk rotation to the right  GAIT:   Weightbearing Status: WBAT  Assistive Device(s): None  Gait Deviations: Antalgic  Base of support increased  Mena decreased  ROM:   (Degrees) Left AROM Left PROM  Right AROM Right PROM   Hip Flexion  Min  Min    Hip Extension       Hip Abduction       Hip Adduction       Hip Internal Rotation  Mod-severe limitation with increased pain  Mod  limitiation   Hip External Rotation  Min limitation  Min limitation   Knee Flexion       Knee Extension       Lumbar Side bending Mod-severe limitation with increased pain Mod-severe limitation with increased pain   Lumbar Flexion Min to nil loss   Lumbar Extension Mod-severe limitation with increased pain   Pain:   End feel:   PELVIC/SI SCREEN:   STRENGTH:  hip abduction and adduction 5/5 B, knee flexion 5/5    MYOTOMES:    Left Right   T12-L3 (Hip Flexion) 5 5   L2-4 (Quads)  5 5   L4 (Ankle DF) 5 5   L5 (Great Toe Ext)     S1 (Toe Raise)       NEURAL TENSION:  patient exhibits neural tension in both sciatic nerve and femoral nerve on left  FLEXIBILITY: Decreased piriformis L, Decreased hip flexors L, Decreased IT band L, Decreased quadriceps L  LUMBAR/HIP Special Tests:    PELVIS/SI SPECIAL TESTS:   FUNCTIONAL TESTS:   PALPATION:  tender to palpation left glut max, med, piriformis.  Lumbar paraspinals not tender.  SPINAL SEGMENTAL CONCLUSIONS:       Assessment & Plan   CLINICAL IMPRESSIONS  Medical Diagnosis: Sacroiliitis    Treatment Diagnosis: Sacroiliitis   Impression/Assessment: Patient is a 68 year old female with pain complaints consistent with medical diagnosis with some added nerve mobility symptoms.  The following significant findings have been identified: Pain, Decreased ROM/flexibility, Decreased joint mobility, Decreased strength, Impaired gait, Impaired muscle performance, Decreased activity tolerance, and Impaired posture. These impairments interfere with their ability to perform self care tasks, recreational activities, household chores, household mobility, and community mobility as compared to previous level of function.     Clinical Decision Making (Complexity):  Clinical Presentation: Stable/Uncomplicated  Clinical Presentation Rationale: based on medical and personal factors listed in PT evaluation  Clinical Decision Making (Complexity): Low complexity    PLAN OF CARE  Treatment  Interventions:  Interventions: Gait Training, Manual Therapy, Neuromuscular Re-education, Therapeutic Activity, Therapeutic Exercise, Self-Care/Home Management    Long Term Goals     PT Goal 1  Goal Identifier: HEP  Goal Description: Pateint will be independent with HEP and self management of symptoms  Rationale: to maximize safety and independence with performance of ADLs and functional tasks;to maximize safety and independence within the home  Goal Progress: initial  Target Date: 08/04/24  PT Goal 2  Goal Identifier: sleeping  Goal Description: Patient will report sleeping without interruption from pain or paresthesia  Rationale: to maximize safety and independence with performance of ADLs and functional tasks  Goal Progress: initial  Target Date: 08/04/24  PT Goal 3  Goal Identifier: walking  Goal Description: Patient will return to walking her regular 3-4 miles 3-4 times per week with pain 3/10 or less  Rationale: to maximize safety and independence with performance of ADLs and functional tasks;to maximize safety and independence within the community  Goal Progress: initial  Target Date: 08/04/24  PT Goal 4  Goal Identifier: yoga  Goal Description: Patient will return to yoga classes without pain  Rationale: to maximize safety and independence with performance of ADLs and functional tasks  Goal Progress: initial  Target Date: 08/04/24      Frequency of Treatment: 1x/week  Duration of Treatment: 60 days    Recommended Referrals to Other Professionals: not at this time  Education Assessment:   Learner/Method: Patient;Significant Other;No Barriers to Learning    Risks and benefits of evaluation/treatment have been explained.   Patient/Family/caregiver agrees with Plan of Care.     Evaluation Time:     PT Eval, Low Complexity Minutes (34318): 35       Signing Clinician: Mert Merino PT      [facility]                                                                                    OUTPATIENT PHYSICAL THERAPY      PLAN OF TREATMENT FOR OUTPATIENT REHABILITATION   Patient's Last Name, First Name, Maria Del Carmen Brito YOB: 1955   Provider's Name   [facility]   Medical Record No.  [id number]     Onset Date: 03/15/24  Start of Care Date: 06/05/24     Medical Diagnosis:  Sacroiliitis      PT Treatment Diagnosis:  Sacroiliitis Plan of Treatment  Frequency/Duration: 1x/week/ 60 days    Certification date from 06/05/24 to 08/04/24         See note for plan of treatment details and functional goals     Mert Merino, PT                         I CERTIFY THE NEED FOR THESE SERVICES FURNISHED UNDER        THIS PLAN OF TREATMENT AND WHILE UNDER MY CARE     (Physician attestation of this document indicates review and certification of the therapy plan).              Referring Provider:  AUNG Barfield CNP      Initial Assessment  See Epic Evaluation- Start of Care Date: 06/05/24

## 2024-06-12 ENCOUNTER — THERAPY VISIT (OUTPATIENT)
Dept: PHYSICAL THERAPY | Facility: REHABILITATION | Age: 69
End: 2024-06-12
Attending: NURSE PRACTITIONER
Payer: COMMERCIAL

## 2024-06-12 DIAGNOSIS — M46.1 SACROILIITIS (H): Primary | ICD-10-CM

## 2024-06-12 PROCEDURE — 97110 THERAPEUTIC EXERCISES: CPT | Mod: GP | Performed by: PHYSICAL THERAPIST

## 2024-06-12 PROCEDURE — 97140 MANUAL THERAPY 1/> REGIONS: CPT | Mod: GP | Performed by: PHYSICAL THERAPIST

## 2024-06-20 ENCOUNTER — TELEPHONE (OUTPATIENT)
Dept: FAMILY MEDICINE | Facility: CLINIC | Age: 69
End: 2024-06-20

## 2024-06-20 ENCOUNTER — THERAPY VISIT (OUTPATIENT)
Dept: PHYSICAL THERAPY | Facility: REHABILITATION | Age: 69
End: 2024-06-20
Attending: NURSE PRACTITIONER
Payer: COMMERCIAL

## 2024-06-20 DIAGNOSIS — M46.1 SACROILIITIS (H): Primary | ICD-10-CM

## 2024-06-20 PROCEDURE — 97110 THERAPEUTIC EXERCISES: CPT | Mod: GP | Performed by: PHYSICAL THERAPIST

## 2024-06-20 PROCEDURE — 97140 MANUAL THERAPY 1/> REGIONS: CPT | Mod: GP | Performed by: PHYSICAL THERAPIST

## 2024-06-20 NOTE — TELEPHONE ENCOUNTER
Reason for Call:  Appointment Request    Patient requesting this type of appt:  Office visit     Requested provider: Milton Craft    Reason patient unable to be scheduled: Not within requested timeframe    When does patient want to be seen/preferred time: 3-7 days    Comments: Patient is hoping to be fit in to get WBC checked due to chronic pain.     Could we send this information to you in [place] or would you prefer to receive a phone call?:   Patient would prefer a phone call   Okay to leave a detailed message?: Yes at Home number on file [phone] (home)    Call taken on 6/20/2024 at 10:09 AM by Angela Aguero

## 2024-06-20 NOTE — TELEPHONE ENCOUNTER
TC-    Per Neymar Paz, Patient is schedule for 06/21/2024 at 11am, please call patient to reschedule and follow up with her primary provider. Thank-You    Phyllis Morgan MA

## 2024-06-20 NOTE — TELEPHONE ENCOUNTER
Called patient to cancel visit and sent a high priority message to her PCP to request sooner appt.     Angela GALVAN,    [facility]

## 2024-06-21 NOTE — TELEPHONE ENCOUNTER
Called and made appointment for Monday, nothing else needed. Confirmed with patient.       Audrey AHMADI CMA ([facility])

## 2024-06-21 NOTE — TELEPHONE ENCOUNTER
Speak with me tomorrow to help us select an appointment within her time frame. I think we can work it out. A 20 minutes slot is adequate . Please huddle with me on this case     Milton Craft MD

## 2024-06-24 ENCOUNTER — OFFICE VISIT (OUTPATIENT)
Dept: INTERNAL MEDICINE | Facility: CLINIC | Age: 69
End: 2024-06-24
Payer: COMMERCIAL

## 2024-06-24 VITALS
DIASTOLIC BLOOD PRESSURE: 86 MMHG | TEMPERATURE: 99.1 F | RESPIRATION RATE: 14 BRPM | SYSTOLIC BLOOD PRESSURE: 131 MMHG | WEIGHT: 123.8 LBS | HEIGHT: 63 IN | BODY MASS INDEX: 21.93 KG/M2 | OXYGEN SATURATION: 96 % | HEART RATE: 94 BPM

## 2024-06-24 DIAGNOSIS — M54.42 LEFT-SIDED LOW BACK PAIN WITH LEFT-SIDED SCIATICA, UNSPECIFIED CHRONICITY: Primary | ICD-10-CM

## 2024-06-24 PROCEDURE — 99214 OFFICE O/P EST MOD 30 MIN: CPT | Performed by: INTERNAL MEDICINE

## 2024-06-24 RX ORDER — GABAPENTIN 300 MG/1
300 CAPSULE ORAL 3 TIMES DAILY
Qty: 90 CAPSULE | Refills: 2 | Status: SHIPPED | OUTPATIENT
Start: 2024-06-24 | End: 2024-10-03

## 2024-06-24 NOTE — PROGRESS NOTES
Assessment & Plan     Left-sided low back pain with left-sided sciatica, unspecified chronicity  Patient is well known to me. She's generally doing well and generally in excellent health, unfortunately during her exercise a few months back she developed a new problem with a heavy aching pain in the left buttock area. This pain has steadily worsened to the point that now patient is finding it difficult to sleep and she can no longer do anything , and her quality of life is tanking. She's already been seen at the spine center with [facility] in [place]. Physical therapy tried multiple times has been of no been. Her exam confirms the presence of sciatica and non-steroidal anti-inflammatory drugs not effective. At this point we added Gabapentin [Neurontin] 3 times a day and will obtain lumbar mri evaluation. Based on the radiation of her symptoms I am estimating her discussed injury to be at L4-L5 and her treatment options will be further decided after review of this lumbar mri evaluation   - MR Lumbar Spine w/o Contrast; Future  - gabapentin (NEURONTIN) 300 MG capsule; Take 1 capsule (300 mg) by mouth 3 times daily      Subjective   Maria Del Carmen is a 68 year old, presenting for the following health issues:  Pain (Lt side hip/buttox, and lower left calf )      6/24/2024    10:04 AM   Additional Questions   Roomed by susan     Pain    History of Present Illness       Reason for visit:  Chronic pain. Physical therapy for sacroiliac pain has not given much relief so far. Is there something else going on?  Symptom onset:  More than a month  Symptoms include:  Pain in the left butt and calf, tingling in the leg  Symptom intensity:  Moderate  Symptom progression:  Worsening  Had these symptoms before:  No  What makes it worse:  Lying in a supine position; Getting up from a supine position; Mornings  What makes it better:  Double knee to chest stretch.    She eats 4 or more servings of fruits and vegetables daily.She  "consumes 1 sweetened beverage(s) daily.She exercises with enough effort to increase her heart rate 30 to 60 minutes per day.  She exercises with enough effort to increase her heart rate 4 days per week.   She is taking medications regularly.     3 sessions with physical therapy and a 4th one coming up this Thursday , if not better she may get follow up with nurse practitioner , with Janelle Mackey, Certified Nurse Practitioner with [facility] - patient saw this provider may 16th    We took some of our own history here    Since middle of march 2024 she had great weather and ran 1.25 miles, she started getting as pain in the left buttock . After this she stopped running and would still do some walking. Ever since then her symptoms have steadily worsened.     So today she is feeling the same buttock pain, like  a bruise, now extended to be felt in the calf. It has a tingling and numbess type sensation quality  with otherwise a negative review of GI and  symptoms          Review of Systems  Constitutional, HEENT, cardiovascular, pulmonary, gi and gu systems are negative, except as otherwise noted.      Objective    /86   Pulse 94   Temp 99.1  F (37.3  C) (Temporal)   Resp 14   Ht 1.6 m (5' 3\")   Wt 56.2 kg (123 lb 12.8 oz)   SpO2 96%   BMI 21.93 kg/m    Body mass index is 21.93 kg/m .  Physical Exam   GENERAL: alert and no distress, appears her stated age   EYES: Eyes grossly normal to inspection, PERRL and conjunctivae and sclerae normal  MS: no gross musculoskeletal defects noted, no edema  NEURO: Normal strength and tone, mentation intact and speech normal, with straight leg raising on the left she develops neuropathic pain in the calf and mainly with ankle flexion with her leg elevated to about 45%.  PSYCH: mentation appears normal, affect normal/bright    Orders Placed This Encounter   Procedures    MR Lumbar Spine w/o Contrast             Signed Electronically by: Milton Craft MD    "

## 2024-06-27 ENCOUNTER — THERAPY VISIT (OUTPATIENT)
Dept: PHYSICAL THERAPY | Facility: REHABILITATION | Age: 69
End: 2024-06-27
Payer: COMMERCIAL

## 2024-06-27 DIAGNOSIS — M46.1 SACROILIITIS (H): Primary | ICD-10-CM

## 2024-06-27 PROCEDURE — 97140 MANUAL THERAPY 1/> REGIONS: CPT | Mod: GP | Performed by: PHYSICAL THERAPIST

## 2024-07-03 ENCOUNTER — THERAPY VISIT (OUTPATIENT)
Dept: PHYSICAL THERAPY | Facility: REHABILITATION | Age: 69
End: 2024-07-03
Payer: COMMERCIAL

## 2024-07-03 DIAGNOSIS — M46.1 SACROILIITIS (H): Primary | ICD-10-CM

## 2024-07-03 PROCEDURE — 97112 NEUROMUSCULAR REEDUCATION: CPT | Mod: GP | Performed by: PHYSICAL THERAPIST

## 2024-07-03 PROCEDURE — 97110 THERAPEUTIC EXERCISES: CPT | Mod: GP | Performed by: PHYSICAL THERAPIST

## 2024-07-03 PROCEDURE — 97140 MANUAL THERAPY 1/> REGIONS: CPT | Mod: GP | Performed by: PHYSICAL THERAPIST

## 2024-07-12 ENCOUNTER — HOSPITAL ENCOUNTER (OUTPATIENT)
Dept: MRI IMAGING | Facility: HOSPITAL | Age: 69
Discharge: HOME | End: 2024-07-12
Attending: INTERNAL MEDICINE | Admitting: INTERNAL MEDICINE
Payer: COMMERCIAL

## 2024-07-12 DIAGNOSIS — M54.42 LEFT-SIDED LOW BACK PAIN WITH LEFT-SIDED SCIATICA, UNSPECIFIED CHRONICITY: ICD-10-CM

## 2024-07-12 PROCEDURE — 72148 MRI LUMBAR SPINE W/O DYE: CPT

## 2024-07-16 ENCOUNTER — THERAPY VISIT (OUTPATIENT)
Dept: PHYSICAL THERAPY | Facility: REHABILITATION | Age: 69
End: 2024-07-16
Payer: COMMERCIAL

## 2024-07-16 DIAGNOSIS — M46.1 SACROILIITIS (H): Primary | ICD-10-CM

## 2024-07-16 PROCEDURE — 97112 NEUROMUSCULAR REEDUCATION: CPT | Mod: GP | Performed by: PHYSICAL THERAPIST

## 2024-07-16 PROCEDURE — 97110 THERAPEUTIC EXERCISES: CPT | Mod: GP | Performed by: PHYSICAL THERAPIST

## 2024-07-18 NOTE — TELEPHONE ENCOUNTER
SPINE PATIENTS - NEW PROTOCOL PREVISIT    RECORDS RECEIVED FROM: Internal    REASON FOR VISIT: Left-sided low back pain with left-sided sciatica, unspecified chronicity   PROVIDER: Kiana Belle PA-C   DATE OF APPT: 8/6/24 @ 12:30 pm    NOTES (FOR ALL VISITS) STATUS DETAILS   OFFICE NOTE from referring provider Internal 6/24/24 Milton Craft MD @[facility]     OFFICE NOTE from other specialist Internal 5/116/24 Janelle Mackey APRN CNP @[facility]     MEDICATION LIST Internal    IMAGING  (FOR ALL VISITS)     MRI (HEAD, NECK, SPINE) Internal [facility]  7/12/24 MR Lumbar Spine     XRAY (SPINE) *NEUROSURGERY* Internal [facility]  5/16/24 XR Sacroiliac Joint     DEXA SCAN Internal [facility]  9/27/23 DX Hip/Pelvis/Spine

## 2024-07-19 ENCOUNTER — TELEPHONE (OUTPATIENT)
Dept: FAMILY MEDICINE | Facility: CLINIC | Age: 69
End: 2024-07-19
Payer: COMMERCIAL

## 2024-07-19 ENCOUNTER — TELEPHONE (OUTPATIENT)
Dept: PALLIATIVE MEDICINE | Facility: CLINIC | Age: 69
End: 2024-07-19
Payer: COMMERCIAL

## 2024-07-19 DIAGNOSIS — M54.16 LUMBAR RADICULOPATHY: Primary | ICD-10-CM

## 2024-07-19 NOTE — TELEPHONE ENCOUNTER
Spoke with patient. Relayed provider's message as written. Patient verbalized understanding and has no further questions at this time.    Assisted with finding scheduling number for Lumbar Epidural Steroid Injection.    HEMANT YaN RN  [facility]

## 2024-07-19 NOTE — TELEPHONE ENCOUNTER
"Screening Questions for Radiology Injections:    Injection to be done at which interventional clinic site? [facility]    If choosing [facility] for location, please inform patient:  \"[facility] is a Hospital based clinic. Before your visit, you should check with your insurance about how it covers the charges for facility services in a hospital-based clinic.     Procedure ordered by     Procedure ordered? lumbar epidural steroid injection   Transforaminal Cervical DERIK - Send to [facility] - No [facility] Site providers perform this procedure    What insurance would patient like us to bill for this procedure? ucare  IF SCHEDULING IN [facility] OR SPINE PLEASE SCHEDULE AT LEAST 7-10 BUSINESS DAYS OUT SO A PA CAN BE OBTAINED  Worker's comp or MVA (motor vehicle accident) -Any injection DO NOT SCHEDULE and route to Esperanza Kaminski.    [address] [employer] I insurance - For SI joint injections, DO NOT SCHEDULE and route to Kimberly Carroll.     ALL BCBS, [employer] and [employer] - DO NOT SCHEDULE and route to Kimberly Carroll  MEDICA- ALL INJECTIONS- route to Kimberly Carroll    Is patient scheduled at [facility]? no   If YES, route every encounter to [facility] SPINE CENTER CARE NAVIGATION POOL [[phone]]    Is an  needed? No     Patient has a  home? (Review Grid) YES: ok    Any chance of pregnancy? NO   If YES, do NOT schedule and route to RN pool  - Dr. Cast route to Norah Millan and PM&R Nurse  [[id number]]      Is patient actively being treated for cancer or immunocompromised? No  If YES, do NOT schedule and route to RN pool/ Dr. Cast's Team    Does the patient have a bleeding or clotting disorder? No   If YES, okay to schedule AND route to RN nurse / Dr. Cast's Team   (For any patients with platelet count <100, RN must forward to provider)    Is patient taking any Blood Thinners OR Antiplatelet medication?  No   If hold needed, do NOT schedule, route to VIVIAN castillo/ Dr. Cast's " Team  Examples:   Blood Thinners: (Coumadin, Warfarin, Jantoven, Pradaxa, Xarelto, Eliquis, Edoxaban, Enoxaparin, Lovenox, Heparin, Arixtra, Fondaparinux or Fragmin)  Antiplatelet Medications: (Plavix, Brilinta or Effient)     Is patient taking any aspirin products (includes Excedrin and Fiorinal)? No   If yes route to RN pool/ Dr. Cast's Team - Do not schedule    Is patient taking any GLP-1 Antagonist (hold needed for sedation patients only) No   (semaglutide (Ozempic, Wegovy), dulaglutide (Trulicity), exenatide ER (Bydureon), tirzepatide (Mounjaro), Liraglutide (Saxenda, Victoza), semaglutide (Rybelsus), Terzepatide (Zepbound)  If YES, okay to schedule AND route to RN nurse / Dr. Cast's Team      Any allergies to contrast dye, iodine, shellfish, or numbing and steroid medications? No  If YES, schedule and add allergy information to appointment notes AND route to the RN pool/ Dr. Cast's Team  If DERIK and Contrast Dye / Iodine Allergy? DO NOT SCHEDULE, route to RN pool/ Dr. Cast's Team  Allergies: Patient has no known allergies.     Does patient have an active infection or treated for one within the past week? No  Is patient currently taking any antibiotics or steroid medications?  No   For patients on chronic, preventative, or prophylactic antibiotics, procedures may be scheduled.   For patients on antibiotics for active or recent infection, schedule 4 days after completed.  For patients on steroid medications, schedule 4 days after completed.     Has the patient had a flu shot or any other vaccinations within the past 7 days? No  If yes, explain that for the vaccine to work best they need to:     wait 1 week before and 1 week after getting any Vaccine  wait 1 week before and 2 weeks after getting any Covid Vaccine   If patient has concerns about the timing, send to RN pool/ Dr. Cast's Team    Does patient have an MRI/CT?  YES: MRI Include Date and Check Procedure Scheduling Grid to see if required.  Was  the MRI/CT done within the last 3 years?  Yes   If no route to RN Pool/ Dr. Cast's Team  If yes, where was the MRI/CT done? [facility]   Refer to PACS Transmissions list for approved external locations and route to RN Pool High Priority/ Dr. Pimentels Team  If MRI was not done at approved external location do NOT schedule and route to RN pool/ Dr. Pimentels Team    If patient has an imaging disc, the injection MAY be scheduled but patient must bring disc to appt or appt will be cancelled.    Is patient able to transfer to a procedure table with minimal or no assistance? Yes   If no, do NOT schedule and route to RN Pool/ Dr. Cast's Team    Procedure Specific Instructions:  If celiac plexus block, informed patient NPO for 6 hours and that it is okay to take medications with sips of water, especially blood pressure medications Not Applicable       If this is for a cervical procedure, informed patient that aspirin needs to be held for 6 days.   Not Applicable    Sedation, If Sedation is ordered for any procedure, patient must be NPO for 6 hours prior to procedure Not Applicable    If IV needed:  Do not schedule procedures requiring IV placement in the first appointment of the day or first appointment after lunch. Do NOT schedule at 0745, 0815 or 1245. ok  Instructed patient to arrive 30 minutes early for IV start if required. (Check Procedure Scheduling Grid)  Not Applicable    Reminders:  If you are started on any steroids or antibiotics between now and your appointment, you must contact us because the procedure may need to be cancelled.  Yes    As a reminder, receiving steroids can decrease your body's ability to fight infection.   Would you still like to move forward with scheduling the injection?  Yes    IV Sedation is not provided for procedures. If oral anti-anxiety medication is needed, the patient should request this from their referring provider.    Instruct patient to arrive as directed prior to the  scheduled appointment time:  If IV needed 30 minutes before appointment time     For patients 85 or older we recommend having an adult stay w/ them for the remainder of the day.     If the patient is Diabetic, remind them to bring their glucometer.      Does the patient have any questions?  NO  Jennifer Kaminski  [facility]

## 2024-07-19 NOTE — TELEPHONE ENCOUNTER
RN left message to return call to clinic [phone]  (RN did not leave specific details on voicemail for confidential reasons)    Kae Vyas RN on 7/19/2024 at 12:56 PM

## 2024-07-19 NOTE — TELEPHONE ENCOUNTER
Milton Craft MD  7/17/2024  1:01 PM CDT Back to Top      Neurosurgical consultation and lumbar epidural steroid injection ordered . Please double check that I have ordered the lumbar epss the right way and Reroute if additional input requested from me     Milton Moore RN  7/17/2024  9:16 AM CDT       Spoke with patient. Relayed provider's message as written. Patient verbalized understanding and has no further questions at this time.     Patient is interested in receiving lumbar epidural steroid injection and neurosurgery referral. Routing to provider to please place orders as requested.     -[phone], Ok to leave detailed VM.     CECILY Ya RN  [facility]    Milton Craft MD  7/14/2024 11:16 AM CDT       Please notify patient of this abnormality, that just as we expected to see, her lumbar mri evaluation confirms the presence of a herniated disc which is causing a pinched nerve to affect her left sided L4-L5 intravertebral disk space and this does explain her symptoms quite thoroughly.  We already had reviewed this suspected diagnosis at her appointment and so she is expecting to hear these results .     The logical next step is to receive a lumbar epidural steroid injection but I also think it wise to have follow up with neurosurgery so I am ordering both things. I think she could proceed with the steroid injection even before she has her neurosurgery appointment but ultimately whatever comes first, I still think the neurosurgery appointment is wise given the fact that she needs focused follow up on this condition with the neurosurgery in case the steroid injection isn't effective and so she can be at the right place to consider all her options including microdiskectomy / disk surgery is possible.     Please reroute for orders to be placed  after check-in with patient.     Milton Craft MD

## 2024-07-22 ENCOUNTER — THERAPY VISIT (OUTPATIENT)
Dept: PHYSICAL THERAPY | Facility: REHABILITATION | Age: 69
End: 2024-07-22
Payer: COMMERCIAL

## 2024-07-22 DIAGNOSIS — M46.1 SACROILIITIS (H): Primary | ICD-10-CM

## 2024-07-22 PROCEDURE — 97140 MANUAL THERAPY 1/> REGIONS: CPT | Mod: GP | Performed by: PHYSICAL THERAPIST

## 2024-07-22 PROCEDURE — 97110 THERAPEUTIC EXERCISES: CPT | Mod: GP | Performed by: PHYSICAL THERAPIST

## 2024-07-22 PROCEDURE — 97112 NEUROMUSCULAR REEDUCATION: CPT | Mod: GP | Performed by: PHYSICAL THERAPIST

## 2024-07-30 NOTE — PROGRESS NOTES
[facility] - Procedure Note    Date of Visit: 8/1/2024    Procedure performed: Lumbar L4-5 interlaminar epidural steroid injection  Diagnosis: Lumbar spondylosis; Lumbar radiculitis/radiculopathy  : Yazmin Lake MD  Anesthesia: none    Indications: Maria Del Carmen Almaraz is a 68 year old female who is seen at the request of Dr. Milton Craft for a lumbar epidural steroid injection. The patient describes left sided low back and buttock pain with radiation to the left posterior calf. The patient has been exhibiting symptoms consistent with lumbar intraspinal inflammation and radiculopathy. Symptoms have been persistent, disabling, and intermittently severe. The patient reports minimal improvement with conservative treatment, including PT and medications.    MRI LUMBAR SPINE was done @ [facility] on 7/12/2024 which showed:   FINDINGS:   Alignment is significant for slight reversal of the typical lumbar lordosis and multilevel grade 1 spondylolisthesis. Bone marrow demonstrates scattered mixed degenerative endplate changes including prominent fatty marrow change (Modic 2) surrounding the   L5-S1 disc joint. Conus medullaris is unremarkable terminating at the level of the L1-L2 disc. Cauda equina is unremarkable. Bilateral sacroiliac joint degenerative change. No appreciable extraspinal abnormality on limited assessment.     L1-L2: Mild disc height loss. Disc bulge. Moderate bilateral facet arthropathy. No foraminal or spinal canal stenosis.     L2-L3: Mild disc height loss. Disc bulge. Moderate-severe bilateral facet arthropathy. No right foraminal stenosis. Mild left foraminal stenosis. Mild spinal canal stenosis.      L3-L4: Mild disc height loss. Disc bulge with uncovering. Severe bilateral facet arthropathy. No foraminal stenosis. Mild spinal canal stenosis.     L4-L5: Mild to moderate disc height loss. Left central extrusion impinging on the traversing left L5 nerve root within the  lateral recess. Moderate-severe bilateral facet arthropathy. No foraminal stenosis. Mild spinal canal stenosis.     L5-S1: Severe disc height loss. No herniation. Mild lateral facet arthropathy. Mild bilateral foraminal stenosis. No spinal canal stenosis.                                                                      IMPRESSION:  1.  Left lateral recess disc extrusion at L4-L5 with nerve root impingement.  2.  No high-grade stenoses.    Allergies:    No Known Allergies     Vitals:  BP (!) 139/90 (BP Location: Left arm, Patient Position: Chair, Cuff Size: Adult Regular)   Pulse 87   SpO2 97%     Review of Systems: The patient denies recent fever, chills, illness, use of antibiotics or anticoagulants. All other 10-point review of systems negative.     Procedure: The procedure and risks were explained, and informed written consent was obtained from the patient. Risks include but are not limited to: infection, bleeding, increased pain, and damage to soft tissue, nerve, muscle, and vasculature structures. After getting informed consent, patient was brought into the procedure suite and was placed in a prone position on the procedure table. A Pause for the Cause was performed. Patient was prepped and draped in sterile fashion.     The L4-5 interspace was identified with use of fluoroscopy in AP view. A 25-gauge, 1.5 inch needle was used to anesthetize the skin and subcutaneous tissue entry site with a total of 2 ml of 1% lidocaine. Under fluoroscopic visualization, a 22-gauge, 3.5 inch Tuohy epidural needle was slowly advanced towards the epidural space a few millimeters left of midline. The latter part of the needle advancement was guided with fluoroscopy in the lateral view. The epidural space was identified using loss of resistance technique. After negative aspiration for heme and cerebrospinal fluid, a total of 1 mL of non-ionic contrast was injected to confirm needle placement with 0 mL of contrast wasted.  Epidurogram confirmed spread within the posterior epidural space. 2 ml of 40mg/ml of triamcinolone, 2 ml of 1% lidocaine, and 1 ml of preservative free saline was injected. The needle was removed.  Images were saved to PACS.    The patient tolerated the procedure well, and there was no evidence of procedural complications. No new sensory or motor deficits were noted following the procedure. The patient was stable and able to ambulate on discharge home. Post-procedure instructions were provided.     Pre-procedure pain score: 4/10 in the back, 4/10 in the leg  Post-procedure pain score: 3/10 in the back, 3/10 in the leg    Assessment/Plan: Maria Del Carmen Almaraz is a 68 year old female s/p lumbar interlaminar epidural steroid injection today for lumbar spondylosis and radiculitis/radiculopathy.     1. Following today's procedure, the patient was advised to contact the Pain Management Center for any of the following:   Fever, chills, or night sweats   New onset of pain, numbness, or weakness   Any questions/concerns regarding the procedure  If unable to contact the Pain Center, the patient was instructed to go to a local Emergency Room for any complications.   2. Follow-up with the referring provider in 2 weeks for post-procedure evaluation.    SINAN HEMPHILL MD   Pain Management

## 2024-08-01 ENCOUNTER — RADIOLOGY INJECTION OFFICE VISIT (OUTPATIENT)
Dept: PALLIATIVE MEDICINE | Facility: CLINIC | Age: 69
End: 2024-08-01
Attending: INTERNAL MEDICINE
Payer: COMMERCIAL

## 2024-08-01 VITALS — DIASTOLIC BLOOD PRESSURE: 86 MMHG | HEART RATE: 85 BPM | OXYGEN SATURATION: 98 % | SYSTOLIC BLOOD PRESSURE: 150 MMHG

## 2024-08-01 DIAGNOSIS — M54.42 LEFT-SIDED LOW BACK PAIN WITH LEFT-SIDED SCIATICA, UNSPECIFIED CHRONICITY: ICD-10-CM

## 2024-08-01 DIAGNOSIS — M54.16 LUMBAR RADICULOPATHY: Primary | ICD-10-CM

## 2024-08-01 PROCEDURE — 62323 NJX INTERLAMINAR LMBR/SAC: CPT | Performed by: ANESTHESIOLOGY

## 2024-08-01 RX ORDER — TRIAMCINOLONE ACETONIDE 40 MG/ML
40 INJECTION, SUSPENSION INTRA-ARTICULAR; INTRAMUSCULAR ONCE
Status: COMPLETED | OUTPATIENT
Start: 2024-08-01 | End: 2024-08-01

## 2024-08-01 RX ADMIN — TRIAMCINOLONE ACETONIDE 40 MG: 40 INJECTION, SUSPENSION INTRA-ARTICULAR; INTRAMUSCULAR at 13:44

## 2024-08-01 NOTE — NURSING NOTE
Pre-procedure Intake    If YES to any questions or NO to having a   Please complete laminated checklist and leave on the computer keyboard for Provider, verbally inform provider if able.    For SCS Trial, RFA's or any sedation procedure: Have you been fasting? NA  If yes, for how long?    NA     For any sedation procedure:  Do you take any GLP-1 Antagonist? NO   If yes, when did you take your last dose? N/A    Are you taking any any blood thinners such as Coumadin, Warfarin, Jantoven, Pradaxa Xarelto, Eliquis, Edoxaban, Enoxaparin, Lovenox, Heparin, Arixtra, Fondaparinux, or Fragmin? OR Antiplatelet medication such as Plavix, Brilinta, or Effient? N/A  If yes, when did you take your last dose?   NA     Do you take aspirin?   NO  If cervical procedure, have you held aspirin for 6 days?   NA    Do you have any allergies to contrast dye, iodine, steroid and/or numbing medications?  NO     Are you currently taking antibiotics or have an active infection?  NO     Have you had a fever/elevated temperature within the past week? NO     Are you currently taking oral steroids? NO     Do you have a ? Yes     Are you pregnant or breastfeeding? No      Have you received any vaccines in the past 2 weeks? NO     Notify provider and RNs if systolic BP >170, diastolic BP >100, P >100 or O2 sats < 90%

## 2024-08-01 NOTE — PATIENT INSTRUCTIONS
[facility] Procedure Discharge Instructions    Today you saw:   Dr. Yazmin Lake     Your procedure:  Epidural steroid injection      Medications used:  Lidocaine (anesthetic) Kenalog (steroid)  Omnipaque (contrast)  Saline       Be cautious when walking as numbness and/or weakness in the legs may occur up to 6-8 hours after the procedure due to effect of the local anesthetic  Do not drive for 6 hours. The effect of the local anesthetic could slow your reflexes.   Avoid strenuous activity for the first 24 hours. You may resume your regular activities after that.   You may shower, however avoid swimming, tub baths or hot tubs for 24 hours following your procedure  You may have a mild to moderate increase in pain for several days following the injection.    You may use ice packs for 10-15 minutes, 3 to 4 times a day at the injection site for comfort  Do not use heat to painful areas for 6 to 8 hours. This will give the local anesthetic time to wear off and prevent you from accidentally burning your skin.  Unless you have been directed to avoid the use of anti-inflammatory medications (NSAIDS-ibuprofen, Aleve, Motrin), you may use these medications or Tylenol for pain control if needed.   Possible side effects of steroids that you may experience include flushing, elevated blood pressure, increased appetite, mild headaches and restlessness.  All of these symptoms will get better with time.  It may take up to 14 days for the steroid medication to start working although you may feel the effect as early as a few days after the procedure.   Follow up with your referring provider  (Dr Craft) in 2-3 weeks    If you experience any of the following, call the pain center line during work hours at [phone] or on-call physician after hours at [phone]:  -Fever over 100 degree F  -Swelling, bleeding, redness, drainage, warmth at the injection site  -Progressive weakness or numbness in your legs or  arms  -Loss of bowel or bladder function  -Unusual headache that is not relieved by Tylenol or your regular headache medication  -Unusual new onset of pain that is not improving  '

## 2024-08-01 NOTE — NURSING NOTE
Discharge Information    IV Discontiued Time:  NA    Amount of Fluid Infused:  NA    Discharge Criteria = When patient returns to baseline or as per MD order    Consciousness:  Pt is fully awake    Circulation:  BP +/- 20% of pre-procedure level    Respiration:  Patient is able to breathe deeply    O2 Sat:  Patient is able to maintain O2 Sat >92% on room air    Activity:  Moves 4 extremities on command    Ambulation:  Patient is able to stand and walk or stand and pivot into wheelchair    Dressing:  Clean/dry or No Dressing    Notes:   Discharge instructions and AVS given to patient    Patient meets criteria for discharge?  YES    Admitted to PCU?  No    Responsible adult present to accompany patient home?  Yes    Signature/Title:    Doris Tate RN  RN Care Coordinator  [facility]

## 2024-08-05 DIAGNOSIS — M54.42 CHRONIC LEFT-SIDED LOW BACK PAIN WITH LEFT-SIDED SCIATICA: Primary | ICD-10-CM

## 2024-08-05 DIAGNOSIS — G89.29 CHRONIC LEFT-SIDED LOW BACK PAIN WITH LEFT-SIDED SCIATICA: Primary | ICD-10-CM

## 2024-08-06 ENCOUNTER — TELEPHONE (OUTPATIENT)
Dept: NEUROSURGERY | Facility: CLINIC | Age: 69
End: 2024-08-06

## 2024-08-06 ENCOUNTER — ANCILLARY PROCEDURE (OUTPATIENT)
Dept: GENERAL RADIOLOGY | Facility: CLINIC | Age: 69
End: 2024-08-06
Attending: PHYSICIAN ASSISTANT
Payer: COMMERCIAL

## 2024-08-06 ENCOUNTER — PRE VISIT (OUTPATIENT)
Dept: NEUROSURGERY | Facility: CLINIC | Age: 69
End: 2024-08-06

## 2024-08-06 ENCOUNTER — OFFICE VISIT (OUTPATIENT)
Dept: NEUROSURGERY | Facility: CLINIC | Age: 69
End: 2024-08-06
Attending: INTERNAL MEDICINE
Payer: COMMERCIAL

## 2024-08-06 VITALS
RESPIRATION RATE: 16 BRPM | SYSTOLIC BLOOD PRESSURE: 136 MMHG | HEIGHT: 64 IN | BODY MASS INDEX: 21 KG/M2 | HEART RATE: 89 BPM | DIASTOLIC BLOOD PRESSURE: 89 MMHG | WEIGHT: 123 LBS | OXYGEN SATURATION: 98 %

## 2024-08-06 DIAGNOSIS — M54.42 CHRONIC LEFT-SIDED LOW BACK PAIN WITH LEFT-SIDED SCIATICA: ICD-10-CM

## 2024-08-06 DIAGNOSIS — M70.72 ISCHIAL BURSITIS OF LEFT SIDE: Primary | ICD-10-CM

## 2024-08-06 DIAGNOSIS — G89.29 CHRONIC LEFT-SIDED LOW BACK PAIN WITH LEFT-SIDED SCIATICA: ICD-10-CM

## 2024-08-06 DIAGNOSIS — M54.42 LEFT-SIDED LOW BACK PAIN WITH LEFT-SIDED SCIATICA, UNSPECIFIED CHRONICITY: ICD-10-CM

## 2024-08-06 PROCEDURE — 99215 OFFICE O/P EST HI 40 MIN: CPT | Performed by: PHYSICIAN ASSISTANT

## 2024-08-06 PROCEDURE — 72082 X-RAY EXAM ENTIRE SPI 2/3 VW: CPT | Performed by: STUDENT IN AN ORGANIZED HEALTH CARE EDUCATION/TRAINING PROGRAM

## 2024-08-06 PROCEDURE — 77073 BONE LENGTH STUDIES: CPT | Performed by: STUDENT IN AN ORGANIZED HEALTH CARE EDUCATION/TRAINING PROGRAM

## 2024-08-06 PROCEDURE — 99417 PROLNG OP E/M EACH 15 MIN: CPT | Performed by: PHYSICIAN ASSISTANT

## 2024-08-06 ASSESSMENT — PAIN SCALES - GENERAL: PAINLEVEL: MILD PAIN (3)

## 2024-08-06 NOTE — PROGRESS NOTES
[facility] [address]  3RD FLOOR  [facility] [address]  Phone: [phone]  Fax: [phone]      Patient:  Maria Del Carmen Almaraz, Date of birth 1955, 68 year old, female  Referring Provider Milton Craft MD  [address] [facility] [place] [address]    ASSESSMENT & PLAN:  Patient's radicular pain in LLE has improved after L4/5 ILESI.  She still has tenderness over the ischiogluteal bursa that likely the reason she experiences the left buttock pain with sitting and exercises.      I agree with the conservative treatments her PCP has started her on--L4/5 injection and PT.  Would ask that her PT also address bursitis issues.  Patient okay to use Voltaren gel, ice/heat to help reduce symptoms.  We can injection this area if needed in the future.  Discussed Gabapentin - patient wants to not take if possible.  I informed her the only way to know if the gabapentin is doing anything is to wean down.  If she has increased symptoms, she can go back up.      Follow up if fails to improve after 6 weeks of PT or for worsening symptoms.      I spent 64 minutes spent in patient care, independent review and interpretation of medical records/imaging, reviewing old records.    History of Present Illness:    08/06/2024 Visit: referred for  Left-sided low back pain with left-sided sciatica, unspecified chronicity   PMH   Osteopenia  Left sciatica    Per chart - patient has been seen by neurosurgery in [place] and was recommended to have T and left SI joint injection if not improving.     Patient notes that she has been in a lot of pain in her left buttock area.  Patient denies injury.  She has had the back/buttock pain in the middle of March 2024.  She likes to run, but noticed after a run then that she had buttock pain on the left which did not go away.  She felt a little better in April when she was performing a yoga camel pose.  She is able to sit and walk better since having  an injection 1 week ago.  She denies b/b issues or LE weakness.  She gets some tingling on the bottoms of the toes on the left.      She has been to chiropractor w/o help.  She saw Janelle Mackey who thought she had SI joint issues. She had 3 sessions of PT w/o improvement.  She then went to PCP who ordered lumbar MRI.  She then had an injection last Thursday.  Her pain level has improved.  She is also on gabapentin since the end of June, but she does not feel any improvement with this, but does feel she is a little more tired--helps with sleep.  She had tried Advil prior to this w/o any help.      Conservative Treatment:  PT - 3 times w/o benefit; will be going at least 3 more times  Gabapentin 300 mg tid  Ibuprofen 800 mg tid PRN -   Yoga  Chiropractor - 2x/wk since April - last prior to starting PT - no lasting relief  8/1/24 L4/5 ILESI - at first was about 80% and now feels like it is about 50% effective.            IMAGING   Imaging independently reviewed.    EOS XR 8/6/24 - no change in L3/4 anterolisthesis GI from laying to standing.  Lumbar DDD.  Official radiology report pending.        PAIN Interlaminar Epidural Steroid Injection Lumbar/Sacral  Result Date: 8/1/2024  Images from the original result were not included. [facility] - Procedure Note Date of Visit: 8/1/2024 Procedure performed: Lumbar L4-5 interlaminar epidural steroid injection Diagnosis: Lumbar spondylosis; Lumbar radiculitis/radiculopathy : Yazmin Lake MD Anesthesia: none Indications: Maria Del Carmen Almaraz is a 68 year old female who is seen at the request of Dr. Milton Craft for a lumbar epidural steroid injection. The patient describes left sided low back and buttock pain with radiation to the left posterior calf. The patient has been exhibiting symptoms consistent with lumbar intraspinal inflammation and radiculopathy. Symptoms have been persistent, disabling, and intermittently severe. The patient reports  minimal improvement with conservative treatment, including PT and medications. MRI LUMBAR SPINE was done @ [facility] on 7/12/2024 which showed: FINDINGS: Alignment is significant for slight reversal of the typical lumbar lordosis and multilevel grade 1 spondylolisthesis. Bone marrow demonstrates scattered mixed degenerative endplate changes including prominent fatty marrow change (Modic 2) surrounding the  L5-S1 disc joint. Conus medullaris is unremarkable terminating at the level of the L1-L2 disc. Cauda equina is unremarkable. Bilateral sacroiliac joint degenerative change. No appreciable extraspinal abnormality on limited assessment.  L1-L2: Mild disc height loss. Disc bulge. Moderate bilateral facet arthropathy. No foraminal or spinal canal stenosis.  L2-L3: Mild disc height loss. Disc bulge. Moderate-severe bilateral facet arthropathy. No right foraminal stenosis. Mild left foraminal stenosis. Mild spinal canal stenosis.  L3-L4: Mild disc height loss. Disc bulge with uncovering. Severe bilateral facet arthropathy. No foraminal stenosis. Mild spinal canal stenosis.  L4-L5: Mild to moderate disc height loss. Left central extrusion impinging on the traversing left L5 nerve root within the lateral recess. Moderate-severe bilateral facet arthropathy. No foraminal stenosis. Mild spinal canal stenosis.  L5-S1: Severe disc height loss. No herniation. Mild lateral facet arthropathy. Mild bilateral foraminal stenosis. No spinal canal stenosis.    IMPRESSION: 1.  Left lateral recess disc extrusion at L4-L5 with nerve root impingement. 2.  No high-grade stenoses. Allergies:  No Known Allergies Vitals: BP (!) 139/90 (BP Location: Left arm, Patient Position: Chair, Cuff Size: Adult Regular)   Pulse 87   SpO2 97% Review of Systems: The patient denies recent fever, chills, illness, use of antibiotics or anticoagulants. All other 10-point review of systems negative. Procedure: The procedure and risks were explained,  and informed written consent was obtained from the patient. Risks include but are not limited to: infection, bleeding, increased pain, and damage to soft tissue, nerve, muscle, and vasculature structures. After getting informed consent, patient was brought into the procedure suite and was placed in a prone position on the procedure table. A Pause for the Cause was performed. Patient was prepped and draped in sterile fashion. The L4-5 interspace was identified with use of fluoroscopy in AP view. A 25-gauge, 1.5 inch needle was used to anesthetize the skin and subcutaneous tissue entry site with a total of 2 ml of 1% lidocaine. Under fluoroscopic visualization, a 22-gauge, 3.5 inch Tuohy epidural needle was slowly advanced towards the epidural space a few millimeters left of midline. The latter part of the needle advancement was guided with fluoroscopy in the lateral view. The epidural space was identified using loss of resistance technique. After negative aspiration for heme and cerebrospinal fluid, a total of 1 mL of non-ionic contrast was injected to confirm needle placement with 0 mL of contrast wasted. Epidurogram confirmed spread within the posterior epidural space. 2 ml of 40mg/ml of triamcinolone, 2 ml of 1% lidocaine, and 1 ml of preservative free saline was injected. The needle was removed.  Images were saved to PACS. The patient tolerated the procedure well, and there was no evidence of procedural complications. No new sensory or motor deficits were noted following the procedure. The patient was stable and able to ambulate on discharge home. Post-procedure instructions were provided. Pre-procedure pain score: 4/10 in the back, 4/10 in the leg Post-procedure pain score: 3/10 in the back, 3/10 in the leg Assessment/Plan: Maria Del Carmen Almaraz is a 68 year old female s/p lumbar interlaminar epidural steroid injection today for lumbar spondylosis and radiculitis/radiculopathy. 1. Following today's procedure, the patient  was advised to contact the Pain Management Center for any of the following:  Fever, chills, or night sweats  New onset of pain, numbness, or weakness  Any questions/concerns regarding the procedure If unable to contact the Pain Center, the patient was instructed to go to a local Emergency Room for any complications. 2. Follow-up with the referring provider in 2 weeks for post-procedure evaluation. SINAN HEMPHILL MD Pain Management      MR Lumbar Spine w/o Contrast  Result Date: 7/13/2024  EXAM: MR LUMBAR SPINE WITHOUT CONTRAST LOCATION: [facility] DATE: 07/12/2024 INDICATION: Low back pain; Neurologic deficit, nontraumatic; lower extremity numbness, paresthesia; Increasing persistent lower extremity numbness, paresthesia; No known automatically detected potential contraindications to imaging. COMPARISON: None. TECHNIQUE: Routine Lumbar Spine MRI without IV contrast. FINDINGS: Alignment is significant for slight reversal of the typical lumbar lordosis and multilevel grade 1 spondylolisthesis. Bone marrow demonstrates scattered mixed degenerative endplate changes including prominent fatty marrow change (Modic 2) surrounding the  L5-S1 disc joint. Conus medullaris is unremarkable terminating at the level of the L1-L2 disc. Cauda equina is unremarkable. Bilateral sacroiliac joint degenerative change. No appreciable extraspinal abnormality on limited assessment. L1-L2: Mild disc height loss. Disc bulge. Moderate bilateral facet arthropathy. No foraminal or spinal canal stenosis. L2-L3: Mild disc height loss. Disc bulge. Moderate-severe bilateral facet arthropathy. No right foraminal stenosis. Mild left foraminal stenosis. Mild spinal canal stenosis. L3-L4: Mild disc height loss. Disc bulge with uncovering. Severe bilateral facet arthropathy. No foraminal stenosis. Mild spinal canal stenosis. L4-L5: Mild to moderate disc height loss. Left central extrusion impinging on the traversing left L5 nerve  root within the lateral recess. Moderate-severe bilateral facet arthropathy. No foraminal stenosis. Mild spinal canal stenosis. L5-S1: Severe disc height loss. No herniation. Mild lateral facet arthropathy. Mild bilateral foraminal stenosis. No spinal canal stenosis.   IMPRESSION: 1.  Left lateral recess disc extrusion at L4-L5 with nerve root impingement. 2.  No high-grade stenoses.     XR Sacroiliac Joint G/E 3 Views  Result Date: 2024  EXAM: XR SACROILIAC JOINT G/E 3 VIEWS LOCATION: [facility] DATE: 2024 INDICATION: LEFT SI joint pain, include Cosme's views COMPARISON: None.   IMPRESSION: Degenerative change of both hip joints. Both hips negative for fracture or dislocation. Pelvis negative for fracture. The SI joints are negative.    DX Hip/Pelvis/Spine  Result Date: 2023  BONE DENSITOMETRY [address] [address] 2023  PATIENT: Maria Del Carmen Almaraz CHART: [id number] :  1955 AGE:  68 year old SEX:  female REFERRING PROVIDER:  Milton Craft MD  PROCEDURE:  Bone density scanning was performed using DXA technology of the lumbar spine and hip.  Scanning was performed on a Lunar Prodigy scanner.  Reporting is completed in the form of a T-score.  The T-score represents the standard deviation from peak bone mass based on a young healthy adult.  REFERENCE T-SCORES:     Normal                -1.0 and greater                               Osteopenia         Between -1.0 and -2.5                                         Osteoporosis     -2.5 and less                                   RISK FACTORS:  Post-menopausal, Follow up Low Bone Density (osteopenia) CURRENT TREATMENT:  Calcium, Vitamin D  FINDINGS:              Lumbar Spine (L1-L4)      T-score:  0.1, marked degenerative changes present              Left Femoral Neck            T-score:  -2.0              Right Femoral Neck          T-score:  -2.3                          "Lumbar (L1-L4) BMD: 1.193                           Total Hip Mean BMD: 0.790   IMPRESSION Low Bone Density (osteopenia).  Degenerative changes at the lumbar spine may falsely elevate results. Patient had a study performed previously, however the scans are not available to compare to the current study. Recommendations include ensuring adequate Calcium and Vitamin D. The current NOF Guidelines recommend treatment for patients with prior hip or vertebral fracture, T-score -2.5 or below, or 10 year risk of any major osteoporotic fracture 20% or greater, or 10 year risk of hip fracture 3% or greater as calculated using the FRAX calculator (www.shef.ac.uk/FRAX or you can google \"FRAX\").  This patient's risks based on available information, with the use of FRAX, are 7.1 % for major osteoporotic fracture and 1.7 % for hip fracture. Based on these guidelines, treatment (in addition to calcium and vitamin D) is not recommended for this patient, after ruling out other causes of osteoporosis. This is meant as an aid to clinical decision making; one must still use clinical judgement. Follow up can be considered in 3 years. ___________________ Carly Zhang M.D. Electronically signed       Physical Exam   /89 (BP Location: Right arm, Patient Position: Sitting)   Pulse 89   Resp 16   Ht 1.626 m (5' 4\")   Wt 55.8 kg (123 lb)   SpO2 98%   BMI 21.11 kg/m      Constitutional: Appears well-developed and well-nourished. Cooperative. No acute distress.   HENT:   Head: Normocephalic and atraumatic.   Eyes: Conjunctivae are normal.  Neck: Neck supple. No tracheal deviation present.  Cardiovascular: Normal rate and regular rhythm.    Pulmonary/Chest: Effort normal and breath sounds normal.  Abdominal: Exhibits no obvious distension.   Musculoskeletal: Able to move all extremities.  No involuntary motor movements. +left buttock TTP in sits bone area.  Skin: Skin is warm, dry and intact.   Psychiatric: Normal mood and affect. " Speech is normal and behavior is normal.    Neurological:  Alert, NAD, and oriented to person, place, and time.   No cranial nerve deficit   Gait: steady, symmetrical    Strength (L/R)  5/5 Hip Flexion  5/5 Knee Extension  5/5 Ankle Dorsiflexion  5/5 Extensor Hallucis Longus  5/5 Plantar Flexion    Reflexes (L/R)  2+/2+ Patellar  2+/2+ Ankle    No ankle clonus    Sensation: SILT BLE    Review of Systems   See HPI     No past medical history on file.    Past Surgical History:   Procedure Laterality Date    COLONOSCOPY  10/2009    NO HISTORY OF SURGERY         Social History     Socioeconomic History    Marital status:     Number of children: 0   Occupational History     Employer: [employer]   Tobacco Use    Smoking status: Never    Smokeless tobacco: Never   Vaping Use    Vaping status: Never Used   Substance and Sexual Activity    Alcohol use: Never     Comment: rarely, 1 or 2 glasses a year    Drug use: No    Sexual activity: Yes     Partners: Male     Birth control/protection: Post-menopausal   Other Topics Concern    Parent/sibling w/ CABG, MI or angioplasty before 65F 55M? No     Social Determinants of Health     Financial Resource Strain: Low Risk  (9/20/2023)    Financial Resource Strain     Within the past 12 months, have you or your family members you live with been unable to get utilities (heat, electricity) when it was really needed?: No   Food Insecurity: Low Risk  (9/20/2023)    Food Insecurity     Within the past 12 months, did you worry that your food would run out before you got money to buy more?: No     Within the past 12 months, did the food you bought just not last and you didn t have money to get more?: No   Transportation Needs: Low Risk  (9/20/2023)    Transportation Needs     Within the past 12 months, has lack of transportation kept you from medical appointments, getting your medicines, non-medical meetings or appointments, work, or from getting things that you need?: No   Housing  Stability: Low Risk  (9/20/2023)    Housing Stability     Do you have housing? : Yes     Are you worried about losing your housing?: No       family history includes Breast Cancer in her mother; Hyperlipidemia in her father; Hypertension in her father; Lipids in her father.       Kiana Belle PA-C  Department of Neurosurgery  Office: [phone]

## 2024-08-06 NOTE — LETTER
8/6/2024       RE: Maria Del Carmen Almaraz  [address] [place] [address]     Dear Colleague,    Thank you for referring your patient, Maria Del Carmen Almaraz, to the [facility]UROS[facility]ERY [facility] [place] at [facility]. Please see a copy of my visit note below.      [facility] [place]  [address]  3RD FLOOR  [facility] [address]  Phone: [phone]  Fax: [phone]      Patient:  Maria Del Carmen Almaraz, Date of birth 1955, 68 year old, female  Referring Provider Milton Craft MD  [address] [facility] [place] [address]    ASSESSMENT & PLAN:  Patient's radicular pain in LLE has improved after L4/5 ILESI.  She still has tenderness over the ischiogluteal bursa that likely the reason she experiences the left buttock pain with sitting and exercises.      I agree with the conservative treatments her PCP has started her on--L4/5 injection and PT.  Would ask that her PT also address bursitis issues.  Patient okay to use Voltaren gel, ice/heat to help reduce symptoms.  We can injection this area if needed in the future.  Discussed Gabapentin - patient wants to not take if possible.  I informed her the only way to know if the gabapentin is doing anything is to wean down.  If she has increased symptoms, she can go back up.      Follow up if fails to improve after 6 weeks of PT or for worsening symptoms.      I spent 64 minutes spent in patient care, independent review and interpretation of medical records/imaging, reviewing old records.    History of Present Illness:    08/06/2024 Visit: referred for  Left-sided low back pain with left-sided sciatica, unspecified chronicity   PMH   Osteopenia  Left sciatica    Per chart - patient has been seen by neurosurgery in [place] and was recommended to have T and left SI joint injection if not improving.     Patient notes that she has been in a lot of pain in her left buttock area.  Patient denies  injury.  She has had the back/buttock pain in the middle of March 2024.  She likes to run, but noticed after a run then that she had buttock pain on the left which did not go away.  She felt a little better in April when she was performing a yoga camel pose.  She is able to sit and walk better since having an injection 1 week ago.  She denies b/b issues or LE weakness.  She gets some tingling on the bottoms of the toes on the left.      She has been to chiropractor w/o help.  She saw Janelle Mackey who thought she had SI joint issues. She had 3 sessions of PT w/o improvement.  She then went to PCP who ordered lumbar MRI.  She then had an injection last Thursday.  Her pain level has improved.  She is also on gabapentin since the end of June, but she does not feel any improvement with this, but does feel she is a little more tired--helps with sleep.  She had tried Advil prior to this w/o any help.      Conservative Treatment:  PT - 3 times w/o benefit; will be going at least 3 more times  Gabapentin 300 mg tid  Ibuprofen 800 mg tid PRN -   Yoga  Chiropractor - 2x/wk since April - last prior to starting PT - no lasting relief  8/1/24 L4/5 ILESI - at first was about 80% and now feels like it is about 50% effective.            IMAGING   Imaging independently reviewed.    EOS XR 8/6/24 - no change in L3/4 anterolisthesis GI from laying to standing.  Lumbar DDD.  Official radiology report pending.        PAIN Interlaminar Epidural Steroid Injection Lumbar/Sacral  Result Date: 8/1/2024  Images from the original result were not included. [facility] - Procedure Note Date of Visit: 8/1/2024 Procedure performed: Lumbar L4-5 interlaminar epidural steroid injection Diagnosis: Lumbar spondylosis; Lumbar radiculitis/radiculopathy : Yazmin Lake MD Anesthesia: none Indications: Maria Del Carmen Almaraz is a 68 year old female who is seen at the request of Dr. Milton Craft for a lumbar epidural steroid  injection. The patient describes left sided low back and buttock pain with radiation to the left posterior calf. The patient has been exhibiting symptoms consistent with lumbar intraspinal inflammation and radiculopathy. Symptoms have been persistent, disabling, and intermittently severe. The patient reports minimal improvement with conservative treatment, including PT and medications. MRI LUMBAR SPINE was done @ [facility] on 7/12/2024 which showed: FINDINGS: Alignment is significant for slight reversal of the typical lumbar lordosis and multilevel grade 1 spondylolisthesis. Bone marrow demonstrates scattered mixed degenerative endplate changes including prominent fatty marrow change (Modic 2) surrounding the  L5-S1 disc joint. Conus medullaris is unremarkable terminating at the level of the L1-L2 disc. Cauda equina is unremarkable. Bilateral sacroiliac joint degenerative change. No appreciable extraspinal abnormality on limited assessment.  L1-L2: Mild disc height loss. Disc bulge. Moderate bilateral facet arthropathy. No foraminal or spinal canal stenosis.  L2-L3: Mild disc height loss. Disc bulge. Moderate-severe bilateral facet arthropathy. No right foraminal stenosis. Mild left foraminal stenosis. Mild spinal canal stenosis.  L3-L4: Mild disc height loss. Disc bulge with uncovering. Severe bilateral facet arthropathy. No foraminal stenosis. Mild spinal canal stenosis.  L4-L5: Mild to moderate disc height loss. Left central extrusion impinging on the traversing left L5 nerve root within the lateral recess. Moderate-severe bilateral facet arthropathy. No foraminal stenosis. Mild spinal canal stenosis.  L5-S1: Severe disc height loss. No herniation. Mild lateral facet arthropathy. Mild bilateral foraminal stenosis. No spinal canal stenosis.    IMPRESSION: 1.  Left lateral recess disc extrusion at L4-L5 with nerve root impingement. 2.  No high-grade stenoses. Allergies:  No Known Allergies Vitals: BP (!)  139/90 (BP Location: Left arm, Patient Position: Chair, Cuff Size: Adult Regular)   Pulse 87   SpO2 97% Review of Systems: The patient denies recent fever, chills, illness, use of antibiotics or anticoagulants. All other 10-point review of systems negative. Procedure: The procedure and risks were explained, and informed written consent was obtained from the patient. Risks include but are not limited to: infection, bleeding, increased pain, and damage to soft tissue, nerve, muscle, and vasculature structures. After getting informed consent, patient was brought into the procedure suite and was placed in a prone position on the procedure table. A Pause for the Cause was performed. Patient was prepped and draped in sterile fashion. The L4-5 interspace was identified with use of fluoroscopy in AP view. A 25-gauge, 1.5 inch needle was used to anesthetize the skin and subcutaneous tissue entry site with a total of 2 ml of 1% lidocaine. Under fluoroscopic visualization, a 22-gauge, 3.5 inch Tuohy epidural needle was slowly advanced towards the epidural space a few millimeters left of midline. The latter part of the needle advancement was guided with fluoroscopy in the lateral view. The epidural space was identified using loss of resistance technique. After negative aspiration for heme and cerebrospinal fluid, a total of 1 mL of non-ionic contrast was injected to confirm needle placement with 0 mL of contrast wasted. Epidurogram confirmed spread within the posterior epidural space. 2 ml of 40mg/ml of triamcinolone, 2 ml of 1% lidocaine, and 1 ml of preservative free saline was injected. The needle was removed.  Images were saved to PACS. The patient tolerated the procedure well, and there was no evidence of procedural complications. No new sensory or motor deficits were noted following the procedure. The patient was stable and able to ambulate on discharge home. Post-procedure instructions were provided. Pre-procedure pain  score: 4/10 in the back, 4/10 in the leg Post-procedure pain score: 3/10 in the back, 3/10 in the leg Assessment/Plan: Maria Del Carmen Almaraz is a 68 year old female s/p lumbar interlaminar epidural steroid injection today for lumbar spondylosis and radiculitis/radiculopathy. 1. Following today's procedure, the patient was advised to contact the Pain Management Center for any of the following:  Fever, chills, or night sweats  New onset of pain, numbness, or weakness  Any questions/concerns regarding the procedure If unable to contact the Pain Center, the patient was instructed to go to a local Emergency Room for any complications. 2. Follow-up with the referring provider in 2 weeks for post-procedure evaluation. SINAN HEMPHILL MD Pain Management      MR Lumbar Spine w/o Contrast  Result Date: 7/13/2024  EXAM: MR LUMBAR SPINE WITHOUT CONTRAST LOCATION: [facility] DATE: 07/12/2024 INDICATION: Low back pain; Neurologic deficit, nontraumatic; lower extremity numbness, paresthesia; Increasing persistent lower extremity numbness, paresthesia; No known automatically detected potential contraindications to imaging. COMPARISON: None. TECHNIQUE: Routine Lumbar Spine MRI without IV contrast. FINDINGS: Alignment is significant for slight reversal of the typical lumbar lordosis and multilevel grade 1 spondylolisthesis. Bone marrow demonstrates scattered mixed degenerative endplate changes including prominent fatty marrow change (Modic 2) surrounding the  L5-S1 disc joint. Conus medullaris is unremarkable terminating at the level of the L1-L2 disc. Cauda equina is unremarkable. Bilateral sacroiliac joint degenerative change. No appreciable extraspinal abnormality on limited assessment. L1-L2: Mild disc height loss. Disc bulge. Moderate bilateral facet arthropathy. No foraminal or spinal canal stenosis. L2-L3: Mild disc height loss. Disc bulge. Moderate-severe bilateral facet arthropathy. No right foraminal stenosis.  Mild left foraminal stenosis. Mild spinal canal stenosis. L3-L4: Mild disc height loss. Disc bulge with uncovering. Severe bilateral facet arthropathy. No foraminal stenosis. Mild spinal canal stenosis. L4-L5: Mild to moderate disc height loss. Left central extrusion impinging on the traversing left L5 nerve root within the lateral recess. Moderate-severe bilateral facet arthropathy. No foraminal stenosis. Mild spinal canal stenosis. L5-S1: Severe disc height loss. No herniation. Mild lateral facet arthropathy. Mild bilateral foraminal stenosis. No spinal canal stenosis.   IMPRESSION: 1.  Left lateral recess disc extrusion at L4-L5 with nerve root impingement. 2.  No high-grade stenoses.     XR Sacroiliac Joint G/E 3 Views  Result Date: 2024  EXAM: XR SACROILIAC JOINT G/E 3 VIEWS LOCATION: [facility] DATE: 2024 INDICATION: LEFT SI joint pain, include Cosme's views COMPARISON: None.   IMPRESSION: Degenerative change of both hip joints. Both hips negative for fracture or dislocation. Pelvis negative for fracture. The SI joints are negative.    DX Hip/Pelvis/Spine  Result Date: 2023  BONE DENSITOMETRY [address] [address] 2023  PATIENT: Maria Del Carmen Almaraz CHART: [id number] :  1955 AGE:  68 year old SEX:  female REFERRING PROVIDER:  Milton Craft MD  PROCEDURE:  Bone density scanning was performed using DXA technology of the lumbar spine and hip.  Scanning was performed on a Lunar Prodigy scanner.  Reporting is completed in the form of a T-score.  The T-score represents the standard deviation from peak bone mass based on a young healthy adult.  REFERENCE T-SCORES:     Normal                -1.0 and greater                               Osteopenia         Between -1.0 and -2.5                                         Osteoporosis     -2.5 and less                                   RISK FACTORS:  Post-menopausal, Follow up  "Low Bone Density (osteopenia) CURRENT TREATMENT:  Calcium, Vitamin D  FINDINGS:              Lumbar Spine (L1-L4)      T-score:  0.1, marked degenerative changes present              Left Femoral Neck            T-score:  -2.0              Right Femoral Neck          T-score:  -2.3                         Lumbar (L1-L4) BMD: 1.193                           Total Hip Mean BMD: 0.790   IMPRESSION Low Bone Density (osteopenia).  Degenerative changes at the lumbar spine may falsely elevate results. Patient had a study performed previously, however the scans are not available to compare to the current study. Recommendations include ensuring adequate Calcium and Vitamin D. The current NOF Guidelines recommend treatment for patients with prior hip or vertebral fracture, T-score -2.5 or below, or 10 year risk of any major osteoporotic fracture 20% or greater, or 10 year risk of hip fracture 3% or greater as calculated using the FRAX calculator (www.shef.ac.uk/FRAX or you can google \"FRAX\").  This patient's risks based on available information, with the use of FRAX, are 7.1 % for major osteoporotic fracture and 1.7 % for hip fracture. Based on these guidelines, treatment (in addition to calcium and vitamin D) is not recommended for this patient, after ruling out other causes of osteoporosis. This is meant as an aid to clinical decision making; one must still use clinical judgement. Follow up can be considered in 3 years. ___________________ Carly Zhang M.D. Electronically signed       Physical Exam   /89 (BP Location: Right arm, Patient Position: Sitting)   Pulse 89   Resp 16   Ht 1.626 m (5' 4\")   Wt 55.8 kg (123 lb)   SpO2 98%   BMI 21.11 kg/m      Constitutional: Appears well-developed and well-nourished. Cooperative. No acute distress.   HENT:   Head: Normocephalic and atraumatic.   Eyes: Conjunctivae are normal.  Neck: Neck supple. No tracheal deviation present.  Cardiovascular: Normal rate and regular " rhythm.    Pulmonary/Chest: Effort normal and breath sounds normal.  Abdominal: Exhibits no obvious distension.   Musculoskeletal: Able to move all extremities.  No involuntary motor movements. +left buttock TTP in sits bone area.  Skin: Skin is warm, dry and intact.   Psychiatric: Normal mood and affect. Speech is normal and behavior is normal.    Neurological:  Alert, NAD, and oriented to person, place, and time.   No cranial nerve deficit   Gait: steady, symmetrical    Strength (L/R)  5/5 Hip Flexion  5/5 Knee Extension  5/5 Ankle Dorsiflexion  5/5 Extensor Hallucis Longus  5/5 Plantar Flexion    Reflexes (L/R)  2+/2+ Patellar  2+/2+ Ankle    No ankle clonus    Sensation: SILT BLE    Review of Systems   See HPI     No past medical history on file.    Past Surgical History:   Procedure Laterality Date     COLONOSCOPY  10/2009     NO HISTORY OF SURGERY         Social History     Socioeconomic History     Marital status:      Number of children: 0   Occupational History     Employer: [employer]   Tobacco Use     Smoking status: Never     Smokeless tobacco: Never   Vaping Use     Vaping status: Never Used   Substance and Sexual Activity     Alcohol use: Never     Comment: rarely, 1 or 2 glasses a year     Drug use: No     Sexual activity: Yes     Partners: Male     Birth control/protection: Post-menopausal   Other Topics Concern     Parent/sibling w/ CABG, MI or angioplasty before 65F 55M? No     Social Determinants of Health     Financial Resource Strain: Low Risk  (9/20/2023)    Financial Resource Strain      Within the past 12 months, have you or your family members you live with been unable to get utilities (heat, electricity) when it was really needed?: No   Food Insecurity: Low Risk  (9/20/2023)    Food Insecurity      Within the past 12 months, did you worry that your food would run out before you got money to buy more?: No      Within the past 12 months, did the food you bought just not last and you  didn t have money to get more?: No   Transportation Needs: Low Risk  (9/20/2023)    Transportation Needs      Within the past 12 months, has lack of transportation kept you from medical appointments, getting your medicines, non-medical meetings or appointments, work, or from getting things that you need?: No   Housing Stability: Low Risk  (9/20/2023)    Housing Stability      Do you have housing? : Yes      Are you worried about losing your housing?: No       family history includes Breast Cancer in her mother; Hyperlipidemia in her father; Hypertension in her father; Lipids in her father.       Kiana Belle PA-C  Department of Neurosurgery  Office: [phone]        Again, thank you for allowing me to participate in the care of your patient.      Sincerely,    Kiana Belle PA-C

## 2024-08-06 NOTE — PATIENT INSTRUCTIONS
Agree with PT course.  Recommend also addressing possible ischiogluteal bursitis during PT.      Give L4/5 injection time to work.      Follow up with neurosurgery if no improvement after 6 weeks of PT or worsening symptoms.

## 2024-08-09 ENCOUNTER — THERAPY VISIT (OUTPATIENT)
Dept: PHYSICAL THERAPY | Facility: REHABILITATION | Age: 69
End: 2024-08-09
Payer: COMMERCIAL

## 2024-08-09 DIAGNOSIS — M46.1 SACROILIITIS (H): Primary | ICD-10-CM

## 2024-08-09 PROCEDURE — 97112 NEUROMUSCULAR REEDUCATION: CPT | Mod: GP | Performed by: PHYSICAL THERAPIST

## 2024-08-09 PROCEDURE — 97110 THERAPEUTIC EXERCISES: CPT | Mod: GP | Performed by: PHYSICAL THERAPIST

## 2024-08-16 ENCOUNTER — THERAPY VISIT (OUTPATIENT)
Dept: PHYSICAL THERAPY | Facility: REHABILITATION | Age: 69
End: 2024-08-16
Payer: COMMERCIAL

## 2024-08-16 DIAGNOSIS — M46.1 SACROILIITIS (H): Primary | ICD-10-CM

## 2024-08-16 PROCEDURE — 97110 THERAPEUTIC EXERCISES: CPT | Mod: GP | Performed by: PHYSICAL THERAPIST

## 2024-09-09 ENCOUNTER — PATIENT OUTREACH (OUTPATIENT)
Dept: CARE COORDINATION | Facility: CLINIC | Age: 69
End: 2024-09-09
Payer: COMMERCIAL

## 2024-09-23 PROBLEM — M46.1 SACROILIITIS (H): Status: RESOLVED | Noted: 2024-06-05 | Resolved: 2024-09-23

## 2024-09-23 NOTE — PROGRESS NOTES
DISCHARGE  Reason for Discharge: Patient chooses to discontinue therapy.  Patient has failed to schedule further appointments.    Equipment Issued: NA    Discharge Plan: Patient to continue home program.    Referring Provider:  Janelle Mackey       08/16/24 0500   Appointment Info   Signing clinician's name / credentials Mert Merino, PT   Visits Used 9   Medical Diagnosis Sacroiliitis   PT Tx Diagnosis Sacroiliitis   Other pertinent information 8/9/24  Maria Del Carmen reports 75% overall improvement. She has been able to walk 1.5 miles 2 x since the injection. She has brett able to extend her back without increased pain. Many times she is wilthout pain or 1-2/10, She has been actively working on her postural alignment in standing,   Progress Note/Certification   Start of Care Date 06/05/24   Onset of illness/injury or Date of Surgery 03/15/24   Therapy Frequency 1x/week   Predicted Duration 60 days   Certification date from 06/05/24   Certification date to 08/04/24   Progress Note Due Date 07/05/24   Progress Note Completed Date 06/05/24   PT Goal 1   Goal Identifier HEP   Goal Description Pateint will be independent with HEP and self management of symptoms   Rationale to maximize safety and independence with performance of ADLs and functional tasks;to maximize safety and independence within the home   Goal Progress continue goal   Target Date 08/04/24   PT Goal 2   Goal Identifier sleeping   Goal Description Patient will report sleeping without interruption from pain or paresthesia   Rationale to maximize safety and independence with performance of ADLs and functional tasks   Goal Progress still waking up a couple times per night, but overall better sleep.   Target Date 08/04/24   PT Goal 3   Goal Identifier walking   Goal Description Patient will return to walking her regular 3-4 miles 3-4 times per week with pain 3/10 or less   Rationale to maximize safety and independence with performance of ADLs and  functional tasks;to maximize safety and independence within the community   Goal Progress improving.  15 miles 2x since the injection   Target Date 08/04/24   PT Goal 4   Goal Identifier yoga   Goal Description Patient will return to yoga classes without pain   Rationale to maximize safety and independence with performance of ADLs and functional tasks   Goal Progress continue goal,  she has been doing some yoga on her own at home   Target Date 08/04/24   Subjective Report   Subjective Report Continued improvement.  Able to walk 2 miles in 41 minutes without increased pain.  She is having less pain, but notices a little more tingling.  She was able to go to the zoo, and sit on a harder surface without increased symptoms.  She is happy because she has been able to return to more normal activity levels and activities.   Therapeutic Procedure/Exercise   Therapeutic Procedures: strength, endurance, ROM, flexibility minutes (65775) 20   Ther Proc 1 standing lumbar extension   Ther Proc 1 - Details verbal review   Ther Proc 2 Discussion of planks, pushups, instruction for return to yoga, instruction for return to resistance training.   Ther Proc 2 - Details Instruction to gradually return to normal activity, Instruction to avoid over-gaurding her posture and movement.  Discussion of when to return to yoga class.   Skilled Intervention review HEP   Patient Response/Progress tolerated well   Neuromuscular Re-education   Neuro Re-ed 1 supine LE nerve glide   Neuro Re-ed 2 slump slider patient demonstration   Neuro Re-ed 2 - Details verbal review of core strengthening   Neuro Re-ed 3 standing posture cuing, walking posture cuing correction, side glide   Neuro Re-ed 3 - Details LTR   Neuro Re-ed 4 plank (elbows and toes)   Neuro Re-ed 4 - Details verbal review   Neuro Re-ed 5 Bridge   Neuro Re-ed 5 - Details verbal review   Skilled Intervention review of HEP,   Manual Therapy   Manual Therapy 1 - Details MFR layers 1-3 B: TFL,  quad, ITB, hamstring.  left glut max/med piriformis (supine)   Skilled Intervention manual therapy to improve tissue mobility and decrease pain   Patient Response/Progress tolerated well with mild discomfort and report of overall reduction of symptoms post treatment.   Education   Learner/Method Patient;Significant Other;No Barriers to Learning   Plan   Plan for next session re-assess as neccesary.   Comments   Comments Excellent overall improvement.  Patient feels ready to continue with HEP indpendently.  Trial started.  Hold chart 30 days.   Total Session Time   Timed Code Treatment Minutes 20   Total Treatment Time (sum of timed and untimed services) 20

## 2024-10-01 SDOH — HEALTH STABILITY: PHYSICAL HEALTH: ON AVERAGE, HOW MANY DAYS PER WEEK DO YOU ENGAGE IN MODERATE TO STRENUOUS EXERCISE (LIKE A BRISK WALK)?: 4 DAYS

## 2024-10-01 SDOH — HEALTH STABILITY: PHYSICAL HEALTH: ON AVERAGE, HOW MANY MINUTES DO YOU ENGAGE IN EXERCISE AT THIS LEVEL?: 60 MIN

## 2024-10-01 ASSESSMENT — SOCIAL DETERMINANTS OF HEALTH (SDOH): HOW OFTEN DO YOU GET TOGETHER WITH FRIENDS OR RELATIVES?: ONCE A WEEK

## 2024-10-03 ENCOUNTER — OFFICE VISIT (OUTPATIENT)
Dept: INTERNAL MEDICINE | Facility: CLINIC | Age: 69
End: 2024-10-03
Attending: INTERNAL MEDICINE
Payer: COMMERCIAL

## 2024-10-03 VITALS
HEIGHT: 64 IN | BODY MASS INDEX: 20.66 KG/M2 | DIASTOLIC BLOOD PRESSURE: 84 MMHG | HEART RATE: 76 BPM | TEMPERATURE: 97.9 F | RESPIRATION RATE: 16 BRPM | SYSTOLIC BLOOD PRESSURE: 120 MMHG | OXYGEN SATURATION: 100 % | WEIGHT: 121 LBS

## 2024-10-03 DIAGNOSIS — Z00.00 WELLNESS EXAMINATION: Primary | ICD-10-CM

## 2024-10-03 DIAGNOSIS — R73.09 ELEVATED GLUCOSE: ICD-10-CM

## 2024-10-03 DIAGNOSIS — E78.5 HYPERLIPIDEMIA LDL GOAL <160: ICD-10-CM

## 2024-10-03 LAB
ANION GAP SERPL CALCULATED.3IONS-SCNC: 11 MMOL/L (ref 7–15)
BASOPHILS # BLD AUTO: 0 10E3/UL (ref 0–0.2)
BASOPHILS NFR BLD AUTO: 1 %
BUN SERPL-MCNC: 14.3 MG/DL (ref 8–23)
CALCIUM SERPL-MCNC: 9.7 MG/DL (ref 8.8–10.4)
CHLORIDE SERPL-SCNC: 102 MMOL/L (ref 98–107)
CHOLEST SERPL-MCNC: 251 MG/DL
CREAT SERPL-MCNC: 0.83 MG/DL (ref 0.51–0.95)
EGFRCR SERPLBLD CKD-EPI 2021: 75 ML/MIN/1.73M2
EOSINOPHIL # BLD AUTO: 0.1 10E3/UL (ref 0–0.7)
EOSINOPHIL NFR BLD AUTO: 2 %
ERYTHROCYTE [DISTWIDTH] IN BLOOD BY AUTOMATED COUNT: 12.5 % (ref 10–15)
EST. AVERAGE GLUCOSE BLD GHB EST-MCNC: 111 MG/DL
FASTING STATUS PATIENT QL REPORTED: YES
FASTING STATUS PATIENT QL REPORTED: YES
GLUCOSE SERPL-MCNC: 96 MG/DL (ref 70–99)
HBA1C MFR BLD: 5.5 % (ref 0–5.6)
HCO3 SERPL-SCNC: 25 MMOL/L (ref 22–29)
HCT VFR BLD AUTO: 41.9 % (ref 35–47)
HDLC SERPL-MCNC: 66 MG/DL
HGB BLD-MCNC: 14 G/DL (ref 11.7–15.7)
IMM GRANULOCYTES # BLD: 0 10E3/UL
IMM GRANULOCYTES NFR BLD: 0 %
LDLC SERPL CALC-MCNC: 158 MG/DL
LYMPHOCYTES # BLD AUTO: 1.4 10E3/UL (ref 0.8–5.3)
LYMPHOCYTES NFR BLD AUTO: 26 %
MCH RBC QN AUTO: 32.3 PG (ref 26.5–33)
MCHC RBC AUTO-ENTMCNC: 33.4 G/DL (ref 31.5–36.5)
MCV RBC AUTO: 97 FL (ref 78–100)
MONOCYTES # BLD AUTO: 0.5 10E3/UL (ref 0–1.3)
MONOCYTES NFR BLD AUTO: 9 %
NEUTROPHILS # BLD AUTO: 3.5 10E3/UL (ref 1.6–8.3)
NEUTROPHILS NFR BLD AUTO: 62 %
NONHDLC SERPL-MCNC: 185 MG/DL
PLATELET # BLD AUTO: 242 10E3/UL (ref 150–450)
POTASSIUM SERPL-SCNC: 5.2 MMOL/L (ref 3.4–5.3)
RBC # BLD AUTO: 4.33 10E6/UL (ref 3.8–5.2)
SODIUM SERPL-SCNC: 138 MMOL/L (ref 135–145)
TRIGL SERPL-MCNC: 136 MG/DL
TSH SERPL DL<=0.005 MIU/L-ACNC: 0.78 UIU/ML (ref 0.3–4.2)
WBC # BLD AUTO: 5.5 10E3/UL (ref 4–11)

## 2024-10-03 PROCEDURE — 36415 COLL VENOUS BLD VENIPUNCTURE: CPT | Performed by: INTERNAL MEDICINE

## 2024-10-03 PROCEDURE — 84443 ASSAY THYROID STIM HORMONE: CPT | Performed by: INTERNAL MEDICINE

## 2024-10-03 PROCEDURE — 85025 COMPLETE CBC W/AUTO DIFF WBC: CPT | Performed by: INTERNAL MEDICINE

## 2024-10-03 PROCEDURE — 83036 HEMOGLOBIN GLYCOSYLATED A1C: CPT | Performed by: INTERNAL MEDICINE

## 2024-10-03 PROCEDURE — G0439 PPPS, SUBSEQ VISIT: HCPCS | Performed by: INTERNAL MEDICINE

## 2024-10-03 PROCEDURE — 99213 OFFICE O/P EST LOW 20 MIN: CPT | Mod: 25 | Performed by: INTERNAL MEDICINE

## 2024-10-03 PROCEDURE — 80048 BASIC METABOLIC PNL TOTAL CA: CPT | Performed by: INTERNAL MEDICINE

## 2024-10-03 PROCEDURE — 80061 LIPID PANEL: CPT | Performed by: INTERNAL MEDICINE

## 2024-10-03 NOTE — PROGRESS NOTES
Preventive Care Visit  [facility] IVORY Craft MD, Internal Medicine  Oct 3, 2024      Assessment & Plan     Wellness examination  - Routine screenings and labs order  - PRIMARY CARE FOLLOW-UP SCHEDULING  - REVIEW OF HEALTH MAINTENANCE PROTOCOL ORDERS  - TSH with free T4 reflex; Future  - CBC with platelets and differential; Future  - Basic metabolic panel  (Ca, Cl, CO2, Creat, Gluc, K, Na, BUN); Future  - TSH with free T4 reflex  - CBC with platelets and differential  - Basic metabolic panel  (Ca, Cl, CO2, Creat, Gluc, K, Na, BUN)    Hyperlipidemia LDL goal <160  - Stable with ongoing monitoring.  - Lipid panel reflex to direct LDL Non-fasting; Future  - Lipid panel reflex to direct LDL Non-fasting    Elevated glucose  - Elevated glucose without any clinical evidence concerning for DM.  - Hemoglobin A1c; Future  - Hemoglobin A1c    Patient has been advised of split billing requirements and indicates understanding: Yes      Counseling  Appropriate preventive services were addressed with this patient via screening, questionnaire, or discussion as appropriate for fall prevention, nutrition, physical activity, Tobacco-use cessation, social engagement, weight loss and cognition.  Checklist reviewing preventive services available has been given to the patient.  Reviewed patient's diet, addressing concerns and/or questions.   She is at risk for psychosocial distress and has been provided with information to reduce risk.       Subjective   Maria Del Carmen is a 69 year old, presenting for the following:  Wellness Visit        10/3/2024    10:52 AM   Additional Questions   Roomed by susan         Health Care Directive  Patient does not have a Health Care Directive or Living Will: Discussed advance care planning with patient; however, patient declined at this time.    HPI  Maria Del Carmen is a 5 year old female who present to the clinic for a wellness exam with no further concerns.      10/1/2024   General Health   How would  you rate your overall physical health? Excellent   Feel stress (tense, anxious, or unable to sleep) Only a little      (!) STRESS CONCERN      10/1/2024   Nutrition   Diet: Regular (no restrictions)            10/1/2024   Exercise   Days per week of moderate/strenous exercise 4 days   Average minutes spent exercising at this level 60 min   Walking, yoga and strength training         10/1/2024   Social Factors   Frequency of gathering with friends or relatives Once a week   Worry food won't last until get money to buy more No   Food not last or not have enough money for food? No   Do you have housing? (Housing is defined as stable permanent housing and does not include staying ouside in a car, in a tent, in an abandoned building, in an overnight shelter, or couch-surfing.) Yes   Are you worried about losing your housing? No   Lack of transportation? No   Unable to get utilities (heat,electricity)? No            10/1/2024   Fall Risk   Fallen 2 or more times in the past year? No    No   Trouble with walking or balance? No    No       Multiple values from one day are sorted in reverse-chronological order          10/1/2024   Activities of Daily Living- Home Safety   Needs help with the following daily activites None of the above   Safety concerns in the home None of the above            10/1/2024   Dental   Dentist two times every year? Yes            10/1/2024   Hearing Screening   Hearing concerns? None of the above            10/1/2024   Driving Risk Screening   Patient/family members have concerns about driving No            10/1/2024   General Alertness/Fatigue Screening   Have you been more tired than usual lately? No            10/1/2024   Urinary Incontinence Screening   Bothered by leaking urine in past 6 months No            10/1/2024   TB Screening   Were you born outside of the [place]? No            Today's PHQ-2 Score:       10/3/2024    10:33 AM   PHQ-2 ( 1999 Pf[employer])   Q1: Little interest or pleasure in  doing things 0   Q2: Feeling down, depressed or hopeless 0   PHQ-2 Score 0   Q1: Little interest or pleasure in doing things Not at all   Q2: Feeling down, depressed or hopeless Not at all   PHQ-2 Score 0           10/1/2024   Substance Use   Alcohol more than 3/day or more than 7/wk Not Applicable   Do you have a current opioid prescription? No   How severe/bad is pain from 1 to 10? 1/10   Do you use any other substances recreationally? No        Social History     Tobacco Use    Smoking status: Never    Smokeless tobacco: Never   Vaping Use    Vaping status: Never Used   Substance Use Topics    Alcohol use: Never     Comment: rarely, 1 or 2 glasses a year    Drug use: No           9/20/2022   LAST FHS-7 RESULTS   1st degree relative breast or ovarian cancer Yes   Any relative bilateral breast cancer Unknown   Any male have breast cancer No   Any ONE woman have BOTH breast AND ovarian cancer Yes   Any woman with breast cancer before 50yrs Yes   2 or more relatives with breast AND/OR ovarian cancer No   2 or more relatives with breast AND/OR bowel cancer No           Mammogram Screening - Annual screen due to genetic mutation or genetic mutation in 1st degree family member    ASCVD Risk   The 10-year ASCVD risk score (Cody MAX, et al., 2019) is: 11.9%    Values used to calculate the score:      Age: 69 years      Sex: Female      Is Non- : No      Diabetic: No      Tobacco smoker: No      Systolic Blood Pressure: 136 mmHg      Is BP treated: No      HDL Cholesterol: 64 mg/dL      Total Cholesterol: 225 mg/dL            Reviewed and updated as needed this visit by Provider                    No past medical history on file.  Past Surgical History:   Procedure Laterality Date    COLONOSCOPY  10/2009    NO HISTORY OF SURGERY       OB History   No obstetric history on file.     Labs reviewed in EPIC    BP Readings from Last 6 Encounters:   10/03/24 (!) 147/84   08/06/24 136/89    24 (!) 150/86   24 131/86   24 (!) 154/77   23 128/80         Wt Readings from Last 5 Encounters:   10/03/24 54.9 kg (121 lb)   24 55.8 kg (123 lb)   24 56.2 kg (123 lb 12.8 oz)   24 56.7 kg (125 lb)   23 55.9 kg (123 lb 3.2 oz)     Body mass index is 21.1 kg/m .          Patient Active Problem List   Diagnosis    Hyperlipidemia LDL goal <160     Past Surgical History:   Procedure Laterality Date    COLONOSCOPY  10/2009    NO HISTORY OF SURGERY         Social History     Tobacco Use    Smoking status: Never    Smokeless tobacco: Never   Substance Use Topics    Alcohol use: Never     Comment: rarely, 1 or 2 glasses a year     Family History   Problem Relation Age of Onset    Breast Cancer Mother          at age 49 1/2    Hypertension Father          at age 105    Lipids Father     Hyperlipidemia Father          at age 105         Current Outpatient Medications   Medication Sig Dispense Refill    Calcium-Vitamin D-Vitamin K 500-100-40 MG-UNT-MCG CHEW Take 1 tablet by mouth      Ibuprofen (ADVIL PO) Take by mouth as needed for moderate pain      Loratadine (CLARITIN PO)       Multiple Vitamin (MULTI-VITAMINS) TABS Take 1 tablet by mouth       No Known Allergies  Current providers sharing in care for this patient include:  Patient Care Team:  Milton Craft MD as PCP - General (Internal Medicine)  Milton Craft MD as Assigned PCP  Kiana Belle PA-C as Physician Assistant (Neurological Surgery)  Kiana Belle PA-C as Assigned Neuroscience Provider    The following health maintenance items are reviewed in Epic and correct as of today:  Health Maintenance   Topic Date Due    INFLUENZA VACCINE (1) 2024    COVID-19 Vaccine ( season) 2024    LIPID  2024    ANNUAL REVIEW OF HM ORDERS  2024    MEDICARE ANNUAL WELLNESS VISIT  2024    FALL RISK ASSESSMENT  10/03/2025    MAMMO SCREENING  2025    GLUCOSE  2026     "ADVANCE CARE PLANNING  09/25/2028    DTAP/TDAP/TD IMMUNIZATION (3 - Td or Tdap) 08/06/2029    COLORECTAL CANCER SCREENING  10/27/2032    DEXA  09/27/2038    HEPATITIS C SCREENING  Completed    PHQ-2 (once per calendar year)  Completed    Pneumococcal Vaccine: 65+ Years  Completed    ZOSTER IMMUNIZATION  Completed    RSV VACCINE  Completed    HPV IMMUNIZATION  Aged Out    MENINGITIS IMMUNIZATION  Aged Out    RSV MONOCLONAL ANTIBODY  Aged Out     Health Maintenance Due   Topic Date Due    INFLUENZA VACCINE (1) 09/01/2024    COVID-19 Vaccine (9 - 2024-25 season) 09/01/2024    LIPID  09/25/2024    ANNUAL REVIEW OF HM ORDERS  09/25/2024    MEDICARE ANNUAL WELLNESS VISIT  09/25/2024       No results found for: \"A1C\"  Cholesterol   Date Value Ref Range Status   09/25/2023 225 (H) <200 mg/dL Final   08/11/2020 202 (H) <200 mg/dL Final     Comment:     Desirable:       <200 mg/dl     LDL Cholesterol Calculated   Date Value Ref Range Status   09/25/2023 143 (H) <=100 mg/dL Final   08/11/2020 111 (H) <100 mg/dL Final     Comment:     Above desirable:  100-129 mg/dl  Borderline High:  130-159 mg/dL  High:             160-189 mg/dL  Very high:       >189 mg/dl       TSH   Date Value Ref Range Status   08/11/2020 0.78 0.40 - 4.00 mU/L Final       Review of Systems  Constitutional, HEENT, cardiovascular, pulmonary, gi and gu systems are negative, except as otherwise noted.     Objective    Exam  /84   Pulse 76   Temp 97.9  F (36.6  C) (Temporal)   Resp 16   Ht 1.613 m (5' 3.5\")   Wt 54.9 kg (121 lb)   SpO2 100%   BMI 21.10 kg/m     Estimated body mass index is 21.1 kg/m  as calculated from the following:    Height as of this encounter: 1.613 m (5' 3.5\").    Weight as of this encounter: 54.9 kg (121 lb).    Physical Exam  GENERAL: alert and no distress  EYES: Eyes grossly normal to inspection, PERRL and conjunctivae and sclerae normal  HENT: ear canals and TM's normal, nose and mouth without ulcers or lesions  NECK: no " adenopathy, no asymmetry, masses, or scars  RESP: lungs clear to auscultation - no rales, rhonchi or wheezes  CV: regular rate and rhythm, normal S1 S2, no S3 or S4, no murmur, click or rub, no peripheral edema  ABDOMEN: soft, nontender, no hepatosplenomegaly, no masses and bowel sounds normal  MS: no gross musculoskeletal defects noted, no edema  SKIN: no suspicious lesions or rashes  NEURO: Normal strength and tone, mentation intact and speech normal  PSYCH: mentation appears normal, affect normal/bright        10/3/2024   Mini Cog   Clock Draw Score 2 Normal   3 Item Recall 3 objects recalled   Mini Cog Total Score 5            This patient office visit today was staffed with me, I did review the entire clinical presentation and history, exam and medical decision making with DNP student Reymundo Santos I agree with and have approved the office visit entirely. Patient seen with me and DNP student Reymundo Santos today. I agree with assessment and plans.      Signed Electronically by: Milton Craft MD

## 2024-10-03 NOTE — LETTER
October 4, 2024      Maria Del Carmen Almaraz  [address] [address]        Dear Maria Del Carmen:    We are writing to inform you of your test results.    All of these tests are within acceptable limits, things look good !     Resulted Orders   Lipid panel reflex to direct LDL Non-fasting   Result Value Ref Range    Cholesterol 251 (H) <200 mg/dL    Triglycerides 136 <150 mg/dL    Direct Measure HDL 66 >=50 mg/dL    LDL Cholesterol Calculated 158 (H) <100 mg/dL    Non HDL Cholesterol 185 (H) <130 mg/dL    Patient Fasting > 8hrs? Yes     Narrative    Cholesterol  Desirable: < 200 mg/dL  Borderline High: 200 - 239 mg/dL  High: >= 240 mg/dL    Triglycerides  Normal: < 150 mg/dL  Borderline High: 150 - 199 mg/dL  High: 200-499 mg/dL  Very High: >= 500 mg/dL    Direct Measure HDL  Female: >= 50 mg/dL   Male: >= 40 mg/dL    LDL Cholesterol  Desirable: < 100 mg/dL  Above Desirable: 100 - 129 mg/dL   Borderline High: 130 - 159 mg/dL   High:  160 - 189 mg/dL   Very High: >= 190 mg/dL    Non HDL Cholesterol  Desirable: < 130 mg/dL  Above Desirable: 130 - 159 mg/dL  Borderline High: 160 - 189 mg/dL  High: 190 - 219 mg/dL  Very High: >= 220 mg/dL   Hemoglobin A1c   Result Value Ref Range    Estimated Average Glucose 111 <117 mg/dL    Hemoglobin A1C 5.5 0.0 - 5.6 %      Comment:      Normal <5.7%   Prediabetes 5.7-6.4%    Diabetes 6.5% or higher     Note: Adopted from ADA consensus guidelines.   TSH with free T4 reflex   Result Value Ref Range    TSH 0.78 0.30 - 4.20 uIU/mL   Basic metabolic panel  (Ca, Cl, CO2, Creat, Gluc, K, Na, BUN)   Result Value Ref Range    Sodium 138 135 - 145 mmol/L    Potassium 5.2 3.4 - 5.3 mmol/L    Chloride 102 98 - 107 mmol/L    Carbon Dioxide (CO2) 25 22 - 29 mmol/L    Anion Gap 11 7 - 15 mmol/L    Urea Nitrogen 14.3 8.0 - 23.0 mg/dL    Creatinine 0.83 0.51 - 0.95 mg/dL    GFR Estimate 75 >60 mL/min/1.73m2      Comment:      eGFR calculated using 2021 CKD-EPI equation.    Calcium 9.7 8.8 - 10.4 mg/dL       Comment:      Reference intervals for this test were updated on 7/16/2024 to reflect our healthy population more accurately. There may be differences in the flagging of prior results with similar values performed with this method. Those prior results can be interpreted in the context of the updated reference intervals.    Glucose 96 70 - 99 mg/dL    Patient Fasting > 8hrs? Yes    CBC with platelets and differential   Result Value Ref Range    WBC Count 5.5 4.0 - 11.0 10e3/uL    RBC Count 4.33 3.80 - 5.20 10e6/uL    Hemoglobin 14.0 11.7 - 15.7 g/dL    Hematocrit 41.9 35.0 - 47.0 %    MCV 97 78 - 100 fL    MCH 32.3 26.5 - 33.0 pg    MCHC 33.4 31.5 - 36.5 g/dL    RDW 12.5 10.0 - 15.0 %    Platelet Count 242 150 - 450 10e3/uL    % Neutrophils 62 %    % Lymphocytes 26 %    % Monocytes 9 %    % Eosinophils 2 %    % Basophils 1 %    % Immature Granulocytes 0 %    Absolute Neutrophils 3.5 1.6 - 8.3 10e3/uL    Absolute Lymphocytes 1.4 0.8 - 5.3 10e3/uL    Absolute Monocytes 0.5 0.0 - 1.3 10e3/uL    Absolute Eosinophils 0.1 0.0 - 0.7 10e3/uL    Absolute Basophils 0.0 0.0 - 0.2 10e3/uL    Absolute Immature Granulocytes 0.0 <=0.4 10e3/uL     If you have any questions or concerns, please call the clinic at the number listed above.     Sincerely,      Milton Craft MD/kadi